# Patient Record
Sex: FEMALE | Race: WHITE | Employment: FULL TIME | URBAN - METROPOLITAN AREA
[De-identification: names, ages, dates, MRNs, and addresses within clinical notes are randomized per-mention and may not be internally consistent; named-entity substitution may affect disease eponyms.]

---

## 2017-02-23 ENCOUNTER — OFFICE VISIT (OUTPATIENT)
Dept: FAMILY MEDICINE CLINIC | Age: 27
End: 2017-02-23

## 2017-02-23 VITALS
OXYGEN SATURATION: 100 % | WEIGHT: 188.4 LBS | HEIGHT: 66 IN | RESPIRATION RATE: 18 BRPM | HEART RATE: 89 BPM | TEMPERATURE: 98.4 F | BODY MASS INDEX: 30.28 KG/M2 | SYSTOLIC BLOOD PRESSURE: 122 MMHG | DIASTOLIC BLOOD PRESSURE: 86 MMHG

## 2017-02-23 DIAGNOSIS — H61.21 IMPACTED CERUMEN OF RIGHT EAR: Primary | ICD-10-CM

## 2017-02-23 DIAGNOSIS — R63.5 WEIGHT GAIN: ICD-10-CM

## 2017-02-23 DIAGNOSIS — H60.391 OTHER INFECTIVE ACUTE OTITIS EXTERNA OF RIGHT EAR: ICD-10-CM

## 2017-02-23 DIAGNOSIS — F41.0 PANIC ATTACKS: ICD-10-CM

## 2017-02-23 DIAGNOSIS — F41.9 ANXIETY: ICD-10-CM

## 2017-02-23 RX ORDER — NEOMYCIN SULFATE, POLYMYXIN B SULFATE AND HYDROCORTISONE 10; 3.5; 1 MG/ML; MG/ML; [USP'U]/ML
3 SUSPENSION/ DROPS AURICULAR (OTIC) 4 TIMES DAILY
Qty: 10 ML | Refills: 0 | Status: SHIPPED | OUTPATIENT
Start: 2017-02-23 | End: 2017-03-02

## 2017-02-23 RX ORDER — ALPRAZOLAM 0.25 MG/1
0.25 TABLET ORAL
Qty: 30 TAB | Refills: 0 | Status: SHIPPED | OUTPATIENT
Start: 2017-02-23 | End: 2017-07-28

## 2017-02-23 RX ORDER — PAROXETINE HYDROCHLORIDE 20 MG/1
TABLET, FILM COATED ORAL
Qty: 30 TAB | Refills: 1 | Status: SHIPPED | OUTPATIENT
Start: 2017-02-23 | End: 2017-07-28

## 2017-02-23 NOTE — PROGRESS NOTES
Jonna Armas is a 23 y.o. female   Chief Complaint   Patient presents with    Medication Refill    pt states she had seen ED then came to office and saw Colonel Soto. Pt then went to urgent care. Then she went to [place] to visit and saw her prior MD there and was told likely vertigo and told to stop paxil and had already stopped xanax. Pt had been given antivert by MIMI Pope and states did not help. Pt states she did not feel better after stopping the paxil and states she was noticing the paxil was helping her quite a bit. Pt states she is still have a panic attack and using xanax prn a few times a month. Pt also concerned about weight gain but states she has been more depressed. Will restart paxil and reevaluate in 4-6 weeks. Pt also with cerumen imopaction of Reatr and would like to get cleaned out if possible. she is a 23y.o. year old female who presents for evalution. Reviewed PmHx, RxHx, FmHx, SocHx, AllgHx and updated and dated in the chart. Review of Systems - negative except as listed above in the HPI    Objective:     Vitals:    02/23/17 1507   BP: 122/86   Pulse: 89   Resp: 18   Temp: 98.4 °F (36.9 °C)   TempSrc: Oral   SpO2: 100%   Weight: 188 lb 6.4 oz (85.5 kg)   Height: 5' 6\" (1.676 m)       Current Outpatient Prescriptions   Medication Sig    ALPRAZolam (XANAX) 0.25 mg tablet Take 1 Tab by mouth daily as needed for Anxiety.  PARoxetine (PAXIL) 20 mg tablet TAKE 1 TABLET BY MOUTH EVERY DAY    neomycin-polymyxin-hydrocortisone, buffered, (PEDIOTIC) 3.5-10,000-1 mg/mL-unit/mL-% otic suspension Administer 3 Drops in right ear four (4) times daily for 7 days.  butalbital-acetaminophen-caff (FIORICET) -40 mg per capsule Take 1 Cap by mouth every six (6) hours as needed for Pain. Max Daily Amount: 4 Caps. Indications: MIGRAINE     No current facility-administered medications for this visit.         Physical Examination: General appearance - alert, well appearing, and in no distress  Mental status - alert, oriented to person, place, and time  Eyes - pupils equal and reactive, extraocular eye movements intact  Ears - left ear normal, right external canal inflamed, ceruminosis noted, cerumen removed, some cerumen  remaining  Chest - clear to auscultation, no wheezes, rales or rhonchi, symmetric air entry  Heart - normal rate, regular rhythm, normal S1, S2, no murmurs, rubs, clicks or gallops      Assessment/ Plan:   Virginia Evans was seen today for medication refill. Diagnoses and all orders for this visit:    Impacted cerumen of right ear  -     REMOVE IMPACTED EAR WAX    Panic attacks  -     ALPRAZolam (XANAX) 0.25 mg tablet; Take 1 Tab by mouth daily as needed for Anxiety. -     PARoxetine (PAXIL) 20 mg tablet; TAKE 1 TABLET BY MOUTH EVERY DAY    Anxiety  -     PARoxetine (PAXIL) 20 mg tablet; TAKE 1 TABLET BY MOUTH EVERY DAY    Other infective acute otitis externa of right ear  -     neomycin-polymyxin-hydrocortisone, buffered, (PEDIOTIC) 3.5-10,000-1 mg/mL-unit/mL-% otic suspension; Administer 3 Drops in right ear four (4) times daily for 7 days. Weight gain  Likely from depression    Use debrox x 2 weeks and RTC for lavage     Follow-up Disposition:  Return if symptoms worsen or fail to improve. I have discussed the diagnosis with the patient and the intended plan as seen in the above orders. The patient has received an after-visit summary and questions were answered concerning future plans. Pt conveyed understanding of plan.     Medication Side Effects and Warnings were discussed with patient      Larry Rivera DO

## 2017-02-23 NOTE — MR AVS SNAPSHOT
Visit Information Date & Time Provider Department Dept. Phone Encounter #  
 2/23/2017  2:45 PM [address] [facility], [address] [phone] [id number] Follow-up Instructions Return if symptoms worsen or fail to improve. Upcoming Health Maintenance Date Due  
 HPV AGE 9Y-34Y (1 of 3 - Female 3 Dose Series) 10/15/2002 DTaP/Tdap/Td series (1 - Tdap) 10/15/2012 PAP AKA CERVICAL CYTOLOGY 10/15/2012 Allergies as of 2/23/2017  Review Complete On: 2/23/2017 By: [address] [facility], DO No Known Allergies Current Immunizations  Reviewed on 10/11/2016 Name Date Influenza Vaccine (Quad) PF 10/11/2016 Not reviewed this visit You Were Diagnosed With   
  
 Codes Comments Impacted cerumen of right ear    -  Primary ICD-10-CM: H61.21 ICD-9-CM: 380.4 Panic attacks     ICD-10-CM: F41.0 ICD-9-CM: 300.01 Anxiety     ICD-10-CM: F41.9 ICD-9-CM: 300.00 Vitals BP  
  
  
  
  
  
 122/86 Vitals History BMI and BSA Data Body Mass Index Body Surface Area  
 30.41 kg/m 2 2 m 2 Preferred Pharmacy Pharmacy Name Phone [facility]  [address] [phone] Your Updated Medication List  
  
   
This list is accurate as of: 2/23/17  3:47 PM.  Always use your most recent med list.  
  
  
  
  
 ALPRAZolam 0.25 mg tablet Commonly known as:  Lorenzo Bond Take 1 Tab by mouth daily as needed for Anxiety. butalbital-acetaminophen-caff -40 mg per capsule Commonly known as:  [employer] Take 1 Cap by mouth every six (6) hours as needed for Pain. Max Daily Amount: 4 Caps. Indications: MIGRAINE PARoxetine 20 mg tablet Commonly known as:  PAXIL TAKE 1 TABLET BY MOUTH EVERY DAY Prescriptions Printed Refills ALPRAZolam (XANAX) 0.25 mg tablet 0 Sig: Take 1 Tab by mouth daily as needed for Anxiety. Class: Print  Route: Oral  
  
Prescriptions Sent to Pharmacy Refills PARoxetine (PAXIL) 20 mg tablet 1 Sig: TAKE 1 TABLET BY MOUTH EVERY DAY Class: Normal  
 Pharmacy: [facility] [address], [address] [address]  #: [phone] Follow-up Instructions Return if symptoms worsen or fail to improve. Patient Instructions Depression and Chronic Disease: Care Instructions Your Care Instructions A chronic disease is one that you have for a long time. Some chronic diseases can be controlled, but they usually cannot be cured. Depression is common in people with chronic diseases, but it often goes unnoticed. Many people have concerns about seeking treatment for a mental health problem. You may think it's a sign of weakness, or you don't want people to know about it. It's important to overcome these reasons for not seeking treatment. Treating depression or anxiety is good for your health. Follow-up care is a key part of your treatment and safety. Be sure to make and go to all appointments, and call your doctor if you are having problems. It's also a good idea to know your test results and keep a list of the medicines you take. How can you care for yourself at home? Watch for symptoms of depression The symptoms of depression are often subtle at first. You may think they are caused by your disease rather than depression. Or you may think it is normal to be depressed when you have a chronic disease. If you are depressed you may: · Feel sad or hopeless. · Feel guilty or worthless. · Not enjoy the things you used to enjoy. · Feel hopeless, as though life is not worth living. · Have trouble thinking or remembering. · Have low energy, and you may not eat or sleep well. · Pull away from others. · Think often about death or killing yourself. (Keep the numbers for these national suicide hotlines: 4-719-167-TALK [1-922.995.1002] and 5-428-QHOANCS [1-104.920.5066]. ) Get treatment By treating your depression, you can feel more hopeful and have more energy. If you feel better, you may take better care of yourself, so your health may improve. · Talk to your doctor if you have any changes in mood during treatment for your disease. · Ask your doctor for help. Counseling, antidepressant medicine, or a combination of the two can help most people with depression. Often a combination works best. Counseling can also help you cope with having a chronic disease. When should you call for help? Call 911 anytime you think you may need emergency care. For example, call if: 
· You feel like hurting yourself or someone else. · Someone you know has depression and is about to attempt or is attempting suicide. Call your doctor now or seek immediate medical care if: 
· You hear voices. · Someone you know has depression and: 
¨ Starts to give away his or her possessions. ¨ Uses illegal drugs or drinks alcohol heavily. ¨ Talks or writes about death, including writing suicide notes or talking about guns, knives, or pills. ¨ Starts to spend a lot of time alone. ¨ Acts very aggressively or suddenly appears calm. Watch closely for changes in your health, and be sure to contact your doctor if: 
· You do not get better as expected. Where can you learn more? Go to http://feng-padmini.info/. Enter V430 in the search box to learn more about \"Depression and Chronic Disease: Care Instructions. \" Current as of: July 26, 2016 Content Version: 11.1 © 3908-6246 [employer]. Care instructions adapted under license by [employer] (which disclaims liability or warranty for this information). If you have questions about a medical condition or this instruction, always ask your healthcare professional. [address] any warranty or liability for your use of this information. Introducing [facility]!    
 Clayton Quezada introduces DramaFever patient portal. Now you can access parts of your medical record, email your doctor's office, and request medication refills online. 1. In your internet browser, go to [email or web address] 2. Click on the First Time User? Click Here link in the Sign In box. You will see the New Member Sign Up page. 3. Enter your Restlet Access Code exactly as it appears below. You will not need to use this code after youve completed the sign-up process. If you do not sign up before the expiration date, you must request a new code. · Restlet Access Code: [email or web address] Expires: 5/24/2017  3:28 PM 
 
4. Enter the last four digits of your Social Security Number (xxxx) and Date of Birth (mm/dd/yyyy) as indicated and click Submit. You will be taken to the next sign-up page. 5. Create a Restlet ID. This will be your Restlet login ID and cannot be changed, so think of one that is secure and easy to remember. 6. Create a Restlet password. You can change your password at any time. 7. Enter your Password Reset Question and Answer. This can be used at a later time if you forget your password. 8. Enter your e-mail address. You will receive e-mail notification when new information is available in [address] 9. Click Sign Up. You can now view and download portions of your medical record. 10. Click the Download Summary menu link to download a portable copy of your medical information. If you have questions, please visit the Frequently Asked Questions section of the Restlet website. Remember, Restlet is NOT to be used for urgent needs. For medical emergencies, dial 911. Now available from your iPhone and Android! Please provide this summary of care documentation to your next provider. Your primary care clinician is listed as Lluvia Cisneros. If you have any questions after today's visit, please call [phone].

## 2017-02-23 NOTE — PROGRESS NOTES
Pt states she stopped paxil and xanax d/t vertigo issues, thought it may be causing.  Noticed no difference with dizziness once stopping med  Would like to restart  Would like to discuss weight gain as well

## 2017-07-28 ENCOUNTER — HOSPITAL ENCOUNTER (EMERGENCY)
Age: 27
Discharge: HOME | End: 2017-07-28
Attending: EMERGENCY MEDICINE | Admitting: EMERGENCY MEDICINE
Payer: COMMERCIAL

## 2017-07-28 ENCOUNTER — APPOINTMENT (OUTPATIENT)
Dept: GENERAL RADIOLOGY | Age: 27
End: 2017-07-28
Attending: PHYSICIAN ASSISTANT
Payer: COMMERCIAL

## 2017-07-28 VITALS
TEMPERATURE: 98.2 F | OXYGEN SATURATION: 98 % | SYSTOLIC BLOOD PRESSURE: 143 MMHG | RESPIRATION RATE: 18 BRPM | HEART RATE: 78 BPM | DIASTOLIC BLOOD PRESSURE: 86 MMHG

## 2017-07-28 DIAGNOSIS — M54.2 NECK PAIN: ICD-10-CM
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
## 2017-07-28 PROCEDURE — 99282 EMERGENCY DEPT VISIT SF MDM: CPT

## 2017-07-28 PROCEDURE — 72050 X-RAY EXAM NECK SPINE 4/5VWS: CPT

## 2017-07-28 RX ORDER — METHOCARBAMOL 750 MG/1
750 TABLET, FILM COATED ORAL 4 TIMES DAILY
Qty: 20 TAB | Refills: 0 | Status: SHIPPED | OUTPATIENT
Start: 2017-07-28 | End: 2018-09-07 | Stop reason: ALTCHOICE

## 2017-07-28 RX ORDER — HYDROCODONE BITARTRATE AND ACETAMINOPHEN 5; 325 MG/1; MG/1
1 TABLET ORAL
Qty: 10 TAB | Refills: 0 | Status: SHIPPED | OUTPATIENT
Start: 2017-07-28 | End: 2018-09-07
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
## 2017-07-28 NOTE — LETTER
[address] 
[facility] EMERGENCY DEPT 
[address] [place] [address]. [address] [id number] [phone] Work/School Note Date: 7/28/2017 To Whom It May concern: 
 
Margaret Mathur was seen and treated today in the emergency room by the following provider(s): 
Physician Assistant: GORDY Hoffman. Margaret Mathur NO WORK 24 HOURS. Sincerely, GORDY Hoffman

## 2017-07-28 NOTE — ED NOTES
GORDY Paredes provided patient with verbal and written discharge instructions. Patient discharged ambulatory.
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
## 2017-07-31 ENCOUNTER — TELEPHONE (OUTPATIENT)
Dept: NEUROLOGY | Age: 27
End: 2017-07-31
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
## 2017-08-11 ENCOUNTER — OFFICE VISIT (OUTPATIENT)
Dept: FAMILY MEDICINE CLINIC | Age: 27
End: 2017-08-11

## 2017-08-11 VITALS
HEART RATE: 70 BPM | OXYGEN SATURATION: 95 % | WEIGHT: 171.1 LBS | RESPIRATION RATE: 16 BRPM | SYSTOLIC BLOOD PRESSURE: 122 MMHG | BODY MASS INDEX: 27.5 KG/M2 | TEMPERATURE: 98.8 F | HEIGHT: 66 IN | DIASTOLIC BLOOD PRESSURE: 83 MMHG

## 2017-08-11 DIAGNOSIS — S06.0X0A CONCUSSION, WITHOUT LOC, INITIAL ENCOUNTER: Primary | ICD-10-CM

## 2017-08-11 DIAGNOSIS — R42 DIZZINESS: ICD-10-CM

## 2017-08-11 DIAGNOSIS — R11.0 NAUSEA: ICD-10-CM

## 2017-08-11 RX ORDER — ONDANSETRON 4 MG/1
4 TABLET, ORALLY DISINTEGRATING ORAL
Qty: 20 TAB | Refills: 0 | Status: SHIPPED | OUTPATIENT
Start: 2017-08-11 | End: 2018-09-07 | Stop reason: ALTCHOICE

## 2017-08-11 RX ORDER — SCOLOPAMINE TRANSDERMAL SYSTEM 1 MG/1
1.5 PATCH, EXTENDED RELEASE TRANSDERMAL
Qty: 4 PATCH | Refills: 0 | Status: SHIPPED | OUTPATIENT
Start: 2017-08-11 | End: 2018-09-07 | Stop reason: ALTCHOICE

## 2017-08-11 NOTE — LETTER
8/14/2017 8:40 AM 
 
Ms. Nell Copeland 
[address] [address] To Whom It May Concern: 
 
 
 
Please excuse missed work Friday 8/11/17 due to appointment here to evaluate medical condition. Sincerely, Chele De Paz NP
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
## 2017-08-11 NOTE — PATIENT INSTRUCTIONS
A mild TBI or concussion is an injury to the brain that may result after blunt force or an acceleration/deceleration head injury. You need to rest your brain. Please rest your brain - restrict (or stop) using electronics - such as computers, tv, phones - until the symptoms (dizziness, headache, nausea) go away. Get plenty of sleep and eat a healthy diet. Symptoms may reoccur with physical exertion. Signs to watch for:  Problems could arise over the first 24-48 hours. You should not be left alone and must go to a hospital at once it you:  1. Have a headache that gets worse. 2. Are very drowsy or cant be awakened (woken up). 3. Cant recognize people or places. 4. Have repeated vomiting  5. Behave unusually or seem confused; are very irritable. 6. Have seizures (arms and legs jerk uncontrollably). 7. Are unsteady on your feet; have slurred speech; vision or hearing changes. Individuals who have had a head injury are at higher risk for recurrent head injuries.

## 2017-08-11 NOTE — MR AVS SNAPSHOT
Visit Information Date & Time Provider Department Dept. Phone Encounter #  
 8/11/2017 10:30 AM Milad Marx NP [facility] [phone] [id number] Upcoming Health Maintenance Date Due  
 HPV AGE 9Y-34Y (1 of 3 - Female 3 Dose Series) 10/15/2002 DTaP/Tdap/Td series (1 - Tdap) 10/15/2012 PAP AKA CERVICAL CYTOLOGY 10/15/2012 INFLUENZA AGE 9 TO ADULT 8/1/2017 Allergies as of 8/11/2017  Review Complete On: 8/11/2017 By: Neal Han No Known Allergies Current Immunizations  Reviewed on 10/11/2016 Name Date Influenza Vaccine (Quad) PF 10/11/2016 Not reviewed this visit You Were Diagnosed With   
  
 Codes Comments Concussion, without LOC, initial encounter    -  Primary ICD-10-CM: S06.0X0A 
ICD-9-CM: 850.0 Nausea     ICD-10-CM: R11.0 ICD-9-CM: 787.02 Dizziness     ICD-10-CM: D78 ICD-9-CM: 780.4 Vitals BP Pulse Temp Resp Height(growth percentile) Weight(growth percentile) 122/83 (BP 1 Location: Left arm, BP Patient Position: Sitting) 70 98.8 °F (37.1 °C) (Oral) 16 5' 6\" (1.676 m) 171 lb 1.6 oz (77.6 kg) LMP SpO2 BMI OB Status Smoking Status 08/05/2017 95% 27.62 kg/m2 Having regular periods Never Smoker Vitals History BMI and BSA Data Body Mass Index Body Surface Area  
 27.62 kg/m 2 1.9 m 2 Preferred Pharmacy Pharmacy Name Phone [facility]  [address] [phone] Your Updated Medication List  
  
   
This list is accurate as of: 8/11/17 12:02 PM.  Always use your most recent med list.  
  
  
  
  
 HYDROcodone-acetaminophen 5-325 mg per tablet Commonly known as:  Adilene Dieter Take 1 Tab by mouth every six (6) hours as needed for Pain. Max Daily Amount: 4 Tabs. methocarbamol 750 mg tablet Commonly known as:  XZDGZOT-207 Take 1 Tab by mouth four (4) times daily. ondansetron 4 mg disintegrating tablet Commonly known as: ZOFRAN ODT Take 1 Tab by mouth every eight (8) hours as needed for Nausea. scopolamine 1.5 mg (1 mg over 3 days) Pt3d Commonly known as:  TRANSDERM-SCOP  
1 Patch by TransDERmal route every seventy-two (72) hours. Prescriptions Sent to Pharmacy Refills  
 ondansetron (ZOFRAN ODT) 4 mg disintegrating tablet 0 Sig: Take 1 Tab by mouth every eight (8) hours as needed for Nausea. Class: Normal  
 Pharmacy: [address], [address] [place] [address] Ph #: [phone] Route: Oral  
 scopolamine (TRANSDERM-SCOP) 1.5 mg (1 mg over 3 days) pt3d 0 Si Patch by TransDERmal route every seventy-two (72) hours. Class: Normal  
 Pharmacy: [address], [address] [place] [address] #: [phone] Route: TransDERmal  
  
Patient Instructions A mild TBI or concussion is an injury to the brain that may result after blunt force or an acceleration/deceleration head injury. You need to rest your brain. Please rest your brain - restrict (or stop) using electronics - such as computers, tv, phones - until the symptoms (dizziness, headache, nausea) go away. Get plenty of sleep and eat a healthy diet. Symptoms may reoccur with physical exertion. Signs to watch for: 
Problems could arise over the first 24-48 hours. You should not be left alone and must go to a hospital at once it you: 1. Have a headache that gets worse. 2. Are very drowsy or cant be awakened (woken up). 3. Cant recognize people or places. 4. Have repeated vomiting 5. Behave unusually or seem confused; are very irritable. 6. Have seizures (arms and legs jerk uncontrollably). 7. Are unsteady on your feet; have slurred speech; vision or hearing changes. Individuals who have had a head injury are at higher risk for recurrent head injuries. Introducing [facility]!    
 Nilsa Golden introduces OnState patient portal. Now you can access parts of your medical record, email your doctor's office, and request medication refills online. 1. In your internet browser, go to [email or web address] 2. Click on the First Time User? Click Here link in the Sign In box. You will see the New Member Sign Up page. 3. Enter your Kahub Access Code exactly as it appears below. You will not need to use this code after youve completed the sign-up process. If you do not sign up before the expiration date, you must request a new code. · Kahub Access Code: [email or web address] Expires: 10/26/2017  9:57 AM 
 
4. Enter the last four digits of your Social Security Number (xxxx) and Date of Birth (mm/dd/yyyy) as indicated and click Submit. You will be taken to the next sign-up page. 5. Create a Kahub ID. This will be your Kahub login ID and cannot be changed, so think of one that is secure and easy to remember. 6. Create a Kahub password. You can change your password at any time. 7. Enter your Password Reset Question and Answer. This can be used at a later time if you forget your password. 8. Enter your e-mail address. You will receive e-mail notification when new information is available in [address] 9. Click Sign Up. You can now view and download portions of your medical record. 10. Click the Download Summary menu link to download a portable copy of your medical information. If you have questions, please visit the Frequently Asked Questions section of the Kahub website. Remember, Kahub is NOT to be used for urgent needs. For medical emergencies, dial 911. Now available from your iPhone and Android! Please provide this summary of care documentation to your next provider. Your primary care clinician is listed as Lillie Bernal. If you have any questions after today's visit, please call [phone].

## 2017-08-14 ENCOUNTER — HOSPITAL ENCOUNTER (EMERGENCY)
Age: 27
Discharge: HOME | End: 2017-08-14
Attending: EMERGENCY MEDICINE | Admitting: EMERGENCY MEDICINE
Payer: COMMERCIAL

## 2017-08-14 ENCOUNTER — TELEPHONE (OUTPATIENT)
Dept: FAMILY MEDICINE CLINIC | Age: 27
End: 2017-08-14

## 2017-08-14 ENCOUNTER — APPOINTMENT (OUTPATIENT)
Dept: CT IMAGING | Age: 27
End: 2017-08-14
Attending: EMERGENCY MEDICINE
Payer: COMMERCIAL

## 2017-08-14 DIAGNOSIS — F07.81 POST CONCUSSION SYNDROME: Primary | ICD-10-CM

## 2017-08-14 DIAGNOSIS — F07.81 POST-CONCUSSION VERTIGO: ICD-10-CM

## 2017-08-14 DIAGNOSIS — R42 POST-CONCUSSION VERTIGO: ICD-10-CM

## 2017-08-14 PROCEDURE — 96374 THER/PROPH/DIAG INJ IV PUSH: CPT

## 2017-08-14 PROCEDURE — 96375 TX/PRO/DX INJ NEW DRUG ADDON: CPT

## 2017-08-14 PROCEDURE — 99282 EMERGENCY DEPT VISIT SF MDM: CPT

## 2017-08-14 PROCEDURE — 70450 CT HEAD/BRAIN W/O DYE: CPT

## 2017-08-14 PROCEDURE — 74011250636 HC RX REV CODE- 250/636: Performed by: EMERGENCY MEDICINE

## 2017-08-14 PROCEDURE — 96361 HYDRATE IV INFUSION ADD-ON: CPT

## 2017-08-14 RX ORDER — METOCLOPRAMIDE HYDROCHLORIDE 5 MG/ML
10 INJECTION INTRAMUSCULAR; INTRAVENOUS
Status: COMPLETED | OUTPATIENT
Start: 2017-08-14 | End: 2017-08-14

## 2017-08-14 RX ORDER — KETOROLAC TROMETHAMINE 30 MG/ML
30 INJECTION, SOLUTION INTRAMUSCULAR; INTRAVENOUS
Status: COMPLETED | OUTPATIENT
Start: 2017-08-14 | End: 2017-08-14

## 2017-08-14 RX ORDER — DIPHENHYDRAMINE HYDROCHLORIDE 50 MG/ML
25 INJECTION, SOLUTION INTRAMUSCULAR; INTRAVENOUS
Status: COMPLETED | OUTPATIENT
Start: 2017-08-14 | End: 2017-08-14

## 2017-08-14 RX ORDER — BUTALBITAL, ACETAMINOPHEN AND CAFFEINE 300; 40; 50 MG/1; MG/1; MG/1
1-2 CAPSULE ORAL
Qty: 30 CAP | Refills: 0 | Status: SHIPPED | OUTPATIENT
Start: 2017-08-14 | End: 2017-08-24 | Stop reason: DRUGHIGH

## 2017-08-14 RX ORDER — SODIUM CHLORIDE 0.9 % (FLUSH) 0.9 %
5-10 SYRINGE (ML) INJECTION EVERY 8 HOURS
Status: DISCONTINUED | OUTPATIENT
Start: 2017-08-14 | End: 2017-08-14 | Stop reason: HOSPADM

## 2017-08-14 RX ORDER — MECLIZINE HYDROCHLORIDE 25 MG/1
25 TABLET ORAL
Qty: 30 TAB | Refills: 0 | Status: SHIPPED | OUTPATIENT
Start: 2017-08-14 | End: 2017-08-24

## 2017-08-14 RX ORDER — SODIUM CHLORIDE 0.9 % (FLUSH) 0.9 %
5-10 SYRINGE (ML) INJECTION AS NEEDED
Status: DISCONTINUED | OUTPATIENT
Start: 2017-08-14 | End: 2017-08-14 | Stop reason: HOSPADM

## 2017-08-14 RX ADMIN — SODIUM CHLORIDE 1000 ML: 900 INJECTION, SOLUTION INTRAVENOUS at 11:49

## 2017-08-14 RX ADMIN — DIPHENHYDRAMINE HYDROCHLORIDE 25 MG: 50 INJECTION, SOLUTION INTRAMUSCULAR; INTRAVENOUS at 11:49

## 2017-08-14 RX ADMIN — METOCLOPRAMIDE 10 MG: 5 INJECTION, SOLUTION INTRAMUSCULAR; INTRAVENOUS at 11:49

## 2017-08-14 RX ADMIN — KETOROLAC TROMETHAMINE 30 MG: 30 INJECTION, SOLUTION INTRAMUSCULAR at 11:49
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
## 2017-08-14 NOTE — LETTER
[facility] EMERGENCY DEPT 
[address] [facility] [address] 
[phone] Work/School Note Date: 8/14/2017 To Whom It May concern: 
 
Tanner Camilo was seen and treated today in the emergency room by the following provider(s): 
Attending Provider: Rachelle Corral DO. Tanner Camilo may return to work on 8/16/2017. Vishal Ring  
 
Sincerely, 
 
 
 
 
Rachelle Corral DO
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
## 2017-08-14 NOTE — TELEPHONE ENCOUNTER
Patient called from her work today. She states the dizziness is worse and her vision this morning was so blurry she couldn't see her cell phone. She did put scopolamine patch on Sunday, but feels this may be making dizziness worse. Advised to remove patch and not to use it. Also advised if she has increasing symptoms, including headache, she should consider going to ER for evaluation. Pt requested letter to be faxed to her office this am stating she was seen at [facility] on Friday. Faxed to [phone]. Told her I would put in a referral for neurology as well.

## 2017-08-24 ENCOUNTER — TELEPHONE (OUTPATIENT)
Dept: FAMILY MEDICINE CLINIC | Age: 27
End: 2017-08-24

## 2017-08-24 RX ORDER — BUTALBITAL, ACETAMINOPHEN AND CAFFEINE 50; 325; 40 MG/1; MG/1; MG/1
1 TABLET ORAL
Qty: 30 TAB | Refills: 0 | OUTPATIENT
Start: 2017-08-24 | End: 2018-09-07

## 2017-08-24 NOTE — TELEPHONE ENCOUNTER
Spoke with pt - still experiencing HA, depression increasing and hard to concentrate. States she feels so bad she \"can't even play with her son\". Aashish has been able to control HA and she is requesting refill. Refill called in to [facility].  Referred her to Dr. Harsha Crisostomo for full concussion work up and treatment.

## 2017-09-26 ENCOUNTER — OFFICE VISIT (OUTPATIENT)
Dept: INTERNAL MEDICINE CLINIC | Age: 27
End: 2017-09-26

## 2017-09-26 VITALS
WEIGHT: 170 LBS | BODY MASS INDEX: 27.32 KG/M2 | OXYGEN SATURATION: 100 % | HEART RATE: 68 BPM | RESPIRATION RATE: 16 BRPM | SYSTOLIC BLOOD PRESSURE: 117 MMHG | HEIGHT: 66 IN | TEMPERATURE: 98 F | DIASTOLIC BLOOD PRESSURE: 80 MMHG

## 2017-09-26 DIAGNOSIS — F41.8 ANXIETY WITH DEPRESSION: ICD-10-CM

## 2017-09-26 DIAGNOSIS — S06.0X0A CONCUSSION, WITHOUT LOC, INITIAL ENCOUNTER: Primary | ICD-10-CM

## 2017-09-26 DIAGNOSIS — M54.2 TRIGGER POINT OF NECK: ICD-10-CM

## 2017-09-26 RX ORDER — ESCITALOPRAM OXALATE 5 MG/1
5 TABLET ORAL DAILY
Qty: 30 TAB | Refills: 3 | Status: SHIPPED | OUTPATIENT
Start: 2017-09-26 | End: 2017-10-10 | Stop reason: DRUGHIGH

## 2017-09-26 RX ORDER — TRIAMCINOLONE ACETONIDE 40 MG/ML
40 INJECTION, SUSPENSION INTRA-ARTICULAR; INTRAMUSCULAR ONCE
Qty: 1 ML | Refills: 0
Start: 2017-09-26 | End: 2017-09-26
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
## 2017-09-26 NOTE — MR AVS SNAPSHOT
Visit Information Date & Time Provider Department Dept. Phone Encounter #  
 9/26/2017  4:00 PM [facility] MD Heydi Tucker [facility] and [place] [id number] Follow-up Instructions Return in about 2 weeks (around 10/10/2017). Upcoming Health Maintenance Date Due  
 HPV AGE 9Y-34Y (1 of 3 - Female 3 Dose Series) 10/15/2002 DTaP/Tdap/Td series (1 - Tdap) 10/15/2012 INFLUENZA AGE 9 TO ADULT 8/1/2017 PAP AKA CERVICAL CYTOLOGY 8/22/2020 Allergies as of 9/26/2017  Review Complete On: 9/26/2017 By: [facility] MD Caitlin  
 No Known Allergies Current Immunizations  Reviewed on 10/11/2016 Name Date Influenza Vaccine (Quad) PF 10/11/2016 Not reviewed this visit You Were Diagnosed With   
  
 Codes Comments Concussion, without LOC, initial encounter    -  Primary ICD-10-CM: S06.0X0A 
ICD-9-CM: 850.0 Trigger point of neck     ICD-10-CM: M54.2 ICD-9-CM: 723.1 Anxiety with depression     ICD-10-CM: F41.8 ICD-9-CM: 300.4 Vitals BP Pulse Temp Resp Height(growth percentile) Weight(growth percentile) 117/80 (BP 1 Location: Right arm, BP Patient Position: Sitting) 68 98 °F (36.7 °C) (Oral) 16 5' 6\" (1.676 m) 170 lb (77.1 kg) SpO2 BMI OB Status Smoking Status 100% 27.44 kg/m2 Having regular periods Never Smoker BMI and BSA Data Body Mass Index Body Surface Area  
 27.44 kg/m 2 1.89 m 2 Preferred Pharmacy Pharmacy Name Phone [facility] Sreekanth Olivia [address] [phone] Your Updated Medication List  
  
   
This list is accurate as of: 9/26/17  5:25 PM.  Always use your most recent med list.  
  
  
  
  
 butalbital-acetaminophen-caffeine -40 mg per tablet Commonly known as:  Sidra Gerold Take 1 Tab by mouth every four (4) hours as needed for Pain or Headache. Max Daily Amount: 6 Tabs. escitalopram oxalate 5 mg tablet Commonly known as: Abdon Prost Take 1 Tab by mouth daily. HYDROcodone-acetaminophen 5-325 mg per tablet Commonly known as:  Marquis Simpson Take 1 Tab by mouth every six (6) hours as needed for Pain. Max Daily Amount: 4 Tabs. methocarbamol 750 mg tablet Commonly known as:  PEKKBPN-090 Take 1 Tab by mouth four (4) times daily. ondansetron 4 mg disintegrating tablet Commonly known as:  ZOFRAN ODT Take 1 Tab by mouth every eight (8) hours as needed for Nausea. scopolamine 1.5 mg (1 mg over 3 days) Pt3d Commonly known as:  TRANSDERM-SCOP  
1 Patch by TransDERmal route every seventy-two (72) hours. triamcinolone acetonide 40 mg/mL injection Commonly known as:  KENALOG  
1 mL by IntraMUSCular route once for 1 dose. Prescriptions Sent to Pharmacy Refills  
 escitalopram oxalate (LEXAPRO) 5 mg tablet 3 Sig: Take 1 Tab by mouth daily. Class: Normal  
 Pharmacy: [address] #: [phone] Route: Oral  
  
We Performed the Following AL INJECT TRIGGER POINT, 1 OR 2 [id number] CPT(R)] REFERRAL TO PHYSICAL THERAPY [TTH60 Custom] TRIAMCINOLONE ACETONIDE INJ [ [facility]] Follow-up Instructions Return in about 2 weeks (around 10/10/2017). Referral Information Referral ID Referred By Referred To  
  
 [id number] [address] PT [facility]IN   
   [address], [address] Phone: [phone] Fax: [phone] Visits Status Start Date End Date  
 20 New Request 9/26/17 9/26/18 If your referral has a status of pending review or denied, additional information will be sent to support the outcome of this decision. Introducing [facility]! [facility] introduces [employer] patient portal. Now you can access parts of your medical record, email your doctor's office, and request medication refills online.    
 
1. In your internet browser, go to [email or web address] 2. Click on the First Time User? Click Here link in the Sign In box. You will see the New Member Sign Up page. 3. Enter your Bab[employer] Access Code exactly as it appears below. You will not need to use this code after youve completed the sign-up process. If you do not sign up before the expiration date, you must request a new code. · [employer] Access Code: [email or web address] Expires: 10/26/2017  9:57 AM 
 
4. Enter the last four digits of your Social Security Number (xxxx) and Date of Birth (mm/dd/yyyy) as indicated and click Submit. You will be taken to the next sign-up page. 5. Create a Baboo ID. This will be your Baboo login ID and cannot be changed, so think of one that is secure and easy to remember. 6. Create a Baboo password. You can change your password at any time. 7. Enter your Password Reset Question and Answer. This can be used at a later time if you forget your password. 8. Enter your e-mail address. You will receive e-mail notification when new information is available in [address] 9. Click Sign Up. You can now view and download portions of your medical record. 10. Click the Download Summary menu link to download a portable copy of your medical information. If you have questions, please visit the Frequently Asked Questions section of the [employer] website. Remember, Baboo is NOT to be used for urgent needs. For medical emergencies, dial 911. Now available from your iPhone and Android! Please provide this summary of care documentation to your next provider. Your primary care clinician is listed as Phys Other. If you have any questions after today's visit, please call [phone].

## 2017-09-26 NOTE — LETTER
NOTIFICATION RETURN TO WORK / SCHOOL 
 
9/26/2017 5:29 PM 
 
Ms. Nell Copeland 
[id number]  [phone] To Whom It May Concern: 
 
Brigitte Sellers is currently under the care of Natalie Edwards. 9/26/2017 She will return to work/school on: 9/27/2017 If there are questions or concerns please have the patient contact our office. Sincerely, Lita Whitman MD

## 2017-09-28 ENCOUNTER — HOSPITAL ENCOUNTER (OUTPATIENT)
Dept: PHYSICAL THERAPY | Age: 27
Discharge: HOME | End: 2017-09-28
Payer: COMMERCIAL

## 2017-09-28 PROCEDURE — 97112 NEUROMUSCULAR REEDUCATION: CPT | Performed by: PHYSICAL THERAPIST

## 2017-09-28 PROCEDURE — 97161 PT EVAL LOW COMPLEX 20 MIN: CPT | Performed by: PHYSICAL THERAPIST

## 2017-09-28 NOTE — PROGRESS NOTES
[facility]  [address]  Phone: [phone]  Fax: [phone]    Plan of Care/Statement of Necessity for Physical Therapy Services  2-15    Patient name: Audie Verde  : 1990  Provider#: [id number]  Referral source: Tressa Villar MD      Medical/Treatment Diagnosis: Postconcussional syndrome [F07.81]     Prior Hospitalization: see medical history     Comorbidities: anxiety and depression  Prior Level of Function: complete 20 minutes of exercise seldom or never  Medications: Verified on Patient Summary List    Start of Care: 2017      Onset Date: 2017       The Plan of Care and following information is based on the information from the initial evaluation. Assessment/ cordova information: 23 y.o. Female with post-concussion syndrome with visual deficits (convergence, gaze stabilization and VOR cancellation) and balance deficits present. She is also presenting with cervicogenic source of headaches secondary to cervical strain/sprain.     Evaluation Complexity History MEDIUM  Complexity : 1-2 comorbidities / personal factors will impact the outcome/ POC ; Examination HIGH Complexity : 4+ Standardized tests and measures addressing body structure, function, activity limitation and / or participation in recreation  ;Presentation LOW Complexity : Stable, uncomplicated  ;Clinical Decision Making LOW Complexity : FOTO score of   Overall Complexity Rating: LOW     Problem List: pain affecting function, decrease ROM, decrease strength, impaired gait/ balance, decrease ADL/ functional abilitiies, decrease activity tolerance and decrease flexibility/ joint mobility   Treatment Plan may include any combination of the following: Therapeutic exercise, Therapeutic activities, Neuromuscular re-education, Physical agent/modality, Gait/balance training, Manual therapy, Patient education and Self Care training  Patient / Family readiness to learn indicated by: asking questions and trying to perform skills  Persons(s) to be included in education: patient (P)  Barriers to Learning/Limitations: None  Patient Goal (s): regain strength, feel confident, feel better and be able to go back to work stronger  Patient Self Reported Health Status: good  Rehabilitation Potential: good    Short Term Goals: To be accomplished in 2-4 treatments:  1)  Independent with HEP  2) Perform Gaze Stabilization at 100 bpm in sitting for >/= 30 seconds before onset of symptoms  3) Perform convergence >/= 8 cm before onset of symptoms  4) Perform VOR Cancellation in sitting for >/=30 seconds before onset of symptoms  5) Balance on firm surface with eyes closed >/= 30seconds before onset of symptoms    Long Term Goals: To be accomplished in 5-10 treatments:  1) Perform Gaze Stabilization x1 while walking for >/= 60 seconds without symptoms  2) Perform Gaze Stabilization x2 while walking for >/= 60 seconds without symptoms  3) Perform convergence >/= 6 cm without symptoms  4) Perform VOR Cancellation while walking without reproduction of symptoms  5) Balance on firm surface with eyes closed >/= 60 seconds without symptoms   6) Balance on soft surface with eyes closed >/= 60 seconds without symptoms  7) Tolerate visual conflict while walking without symptoms  8) Tolerate return to physical activity protocol without symptoms    Frequency / Duration: Patient to be seen 2 times per week for 5-10 treatments. Patient/ Caregiver education and instruction: activity modification and exercises    [x]  Plan of care has been reviewed with STEVAN Bull PT, DPT 9/28/2017 4:32 PM    ________________________________________________________________________    I certify that the above Therapy Services are being furnished while the patient is under my care. I agree with the treatment plan and certify that this therapy is necessary.     Physician's Signature:____________________ Date:____________Time: _________
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
## 2017-10-03 ENCOUNTER — HOSPITAL ENCOUNTER (OUTPATIENT)
Dept: PHYSICAL THERAPY | Age: 27
Discharge: HOME | End: 2017-10-03
Payer: COMMERCIAL

## 2017-10-03 PROCEDURE — 97110 THERAPEUTIC EXERCISES: CPT | Performed by: PHYSICAL THERAPIST

## 2017-10-03 PROCEDURE — 97014 ELECTRIC STIMULATION THERAPY: CPT | Performed by: PHYSICAL THERAPIST

## 2017-10-03 PROCEDURE — 97140 MANUAL THERAPY 1/> REGIONS: CPT | Performed by: PHYSICAL THERAPIST

## 2017-10-03 NOTE — PROGRESS NOTES
PT DAILY TREATMENT NOTE - [facility] 2-15    Patient Name: Steve Tamayo  Date:10/3/2017  : 1990  [x]  Patient  Verified  Payor: BRANDY [place] / Plan: [address] [place] [address] / Product Type: PPO /    In time:730a  Out time:900a  Total Treatment Time (min): 65  Total Timed Codes (min): 50  1:1 Treatment Time ( only): --   Visit #: 2     Treatment Area: Postconcussional syndrome [F07.81]    SUBJECTIVE  Pain Level (0-10 scale): 0/10  Any medication changes, allergies to medications, adverse drug reactions, diagnosis change, or new procedure performed?: [x] No    [] Yes (see summary sheet for update)  Subjective functional status/changes:   [] No changes reported  Pt states that she was able to ride her bike for 20 minutes without any difficulty. \"Last night was horrible, starting at 3pm she began to have tightness in the right side of her neck and I just felt stiff. \"    OBJECTIVE    Modality rationale: decrease pain to improve the patients ability to look over shoulders   Min Type Additional Details   15 [x] Estim: []Att   [x]Unatt        []TENS instruct                  []IFC  [x]Premod   []NMES                     []Other:  []w/US   []w/ice   [x]w/heat  Position: supine  Location: right levator scapulae    []  Traction: [] Cervical       []Lumbar                       [] Prone          []Supine                       []Intermittent   []Continuous Lbs:  [] before manual  [] after manual  []w/heat    []  Ultrasound: []Continuous   [] Pulsed at:                           []1MHz   []3MHz Location:  W/cm2:    [] Paraffin         Location:   []w/heat    []  Ice     []  Heat  []  Ice massage Position:  Location:    []  Laser  []  Other: Position:  Location:      []  Vasopneumatic Device Pressure:       [] lo [] med [] hi   Temperature:      [x] Skin assessment post-treatment:  [x]intact []redness- no adverse reaction    []redness - adverse reaction:     25 min Therapeutic Exercise:  [x] See flow sheet : Rationale: increase ROM, increase strength, improve coordination, improve balance and increase proprioception to improve the patients ability to resume physical activity      25 min Manual Therapy: STM to rigth cervical paraspinal paraspinal, levator scapulae muscles and passive right levator scapulae stretching    Rationale: decrease pain, increase ROM and increase tissue extensibility to improve the patients ability to look over shoulders            With   [] TE   [] TA   [] neuro   [] other: Patient Education: [x] Review HEP    [] Progressed/Changed HEP based on:   [] positioning   [] body mechanics   [] transfers   [] heat/ice application    [] other:      Other Objective/Functional Measures: --     Pain Level (0-10 scale) post treatment: 0/10    ASSESSMENT/Changes in Function:   Worked aggressively with deep pressure at trigger point at right levator scapulae muscle. Advanced cervical rotation and O-A A-P mobility. No increase of headache with treatment today. Patient will continue to benefit from skilled PT services to modify and progress therapeutic interventions, address functional mobility deficits, address ROM deficits, address strength deficits, analyze and address soft tissue restrictions, analyze and cue movement patterns, analyze and modify body mechanics/ergonomics, assess and modify postural abnormalities and instruct in home and community integration to attain remaining goals. []  See Plan of Care  []  See progress note/recertification  []  See Discharge Summary         Progress towards goals / Updated goals:  Short Term Goals:  To be accomplished in 2-4 treatments:  1)  Independent with HEP  2) Perform Gaze Stabilization at 100 bpm in sitting for >/= 30 seconds before onset of symptoms  3) Perform convergence >/= 8 cm before onset of symptoms  4) Perform VOR Cancellation in sitting for >/=30 seconds before onset of symptoms  5) Balance on firm surface with eyes closed >/= 30seconds before onset of symptoms     Long Term Goals:  To be accomplished in 5-10 treatments:  1) Perform Gaze Stabilization x1 while walking for >/= 60 seconds without symptoms  2) Perform Gaze Stabilization x2 while walking for >/= 60 seconds without symptoms  3) Perform convergence >/= 6 cm without symptoms  4) Perform VOR Cancellation while walking without reproduction of symptoms  5) Balance on firm surface with eyes closed >/= 60 seconds without symptoms                 6) Balance on soft surface with eyes closed >/= 60 seconds without symptoms  7) Tolerate visual conflict while walking without symptoms  8) Tolerate return to physical activity protocol without symptoms    PLAN  []  Upgrade activities as tolerated     [x]  Continue plan of care  []  Update interventions per flow sheet       []  Discharge due to:_  []  Other:_      Burt Spurling, PT, DPT 10/3/2017  7:32 AM

## 2017-10-06 ENCOUNTER — APPOINTMENT (OUTPATIENT)
Dept: PHYSICAL THERAPY | Age: 27
End: 2017-10-06
Payer: COMMERCIAL

## 2017-10-10 ENCOUNTER — OFFICE VISIT (OUTPATIENT)
Dept: INTERNAL MEDICINE CLINIC | Age: 27
End: 2017-10-10

## 2017-10-10 ENCOUNTER — HOSPITAL ENCOUNTER (OUTPATIENT)
Dept: PHYSICAL THERAPY | Age: 27
Discharge: HOME | End: 2017-10-10
Payer: COMMERCIAL

## 2017-10-10 VITALS
HEIGHT: 65 IN | HEART RATE: 68 BPM | SYSTOLIC BLOOD PRESSURE: 132 MMHG | DIASTOLIC BLOOD PRESSURE: 84 MMHG | WEIGHT: 165 LBS | BODY MASS INDEX: 27.49 KG/M2 | OXYGEN SATURATION: 100 % | TEMPERATURE: 98 F | RESPIRATION RATE: 18 BRPM

## 2017-10-10 DIAGNOSIS — S06.0X0D CONCUSSION WITHOUT LOSS OF CONSCIOUSNESS, SUBSEQUENT ENCOUNTER: Primary | ICD-10-CM

## 2017-10-10 DIAGNOSIS — F41.9 ANXIETY: ICD-10-CM

## 2017-10-10 PROCEDURE — 97110 THERAPEUTIC EXERCISES: CPT | Performed by: PHYSICAL THERAPIST

## 2017-10-10 PROCEDURE — 97014 ELECTRIC STIMULATION THERAPY: CPT | Performed by: PHYSICAL THERAPIST

## 2017-10-10 PROCEDURE — 97140 MANUAL THERAPY 1/> REGIONS: CPT | Performed by: PHYSICAL THERAPIST

## 2017-10-10 RX ORDER — ESCITALOPRAM OXALATE 10 MG/1
10 TABLET ORAL DAILY
Qty: 30 TAB | Refills: 1 | Status: SHIPPED | OUTPATIENT
Start: 2017-10-10 | End: 2018-09-07

## 2017-10-10 NOTE — PROGRESS NOTES
PT DAILY TREATMENT NOTE - [facility] 2-15    Patient Name: Eve Montesinos  Date:10/10/2017  : 1990  [x]  Patient  Verified  Payor: BRANDY [place] / Plan: [address] [place] [address] / Product Type: PPO /    In time:705a  Out time:815a  Total Treatment Time (min): 65  Total Timed Codes (min): 50  1:1 Treatment Time ( only): --   Visit #: 3     Treatment Area: Postconcussional syndrome [F07.81]    SUBJECTIVE  Pain Level (0-10 scale): 0/10  Any medication changes, allergies to medications, adverse drug reactions, diagnosis change, or new procedure performed?: [x] No    [] Yes (see summary sheet for update)  Subjective functional status/changes:   [] No changes reported  Pt states that she has been doing really well. She has a TENS unit at home and used Biofreeze with good success. She reports that she had intense headache once since last session.     OBJECTIVE  Modality rationale: decrease pain to improve the patients ability to look over shoulders   Min Type Additional Details   15 [x] Estim: []Att   [x]Unatt        []TENS instruct                  []IFC  [x]Premod   []NMES                     []Other:  []w/US   [x]w/ice   []w/heat  Position: supine  Location: right levator scapulae     []  Traction: [] Cervical       []Lumbar                       [] Prone          []Supine                       []Intermittent   []Continuous Lbs:  [] before manual  [] after manual  []w/heat     []  Ultrasound: []Continuous   [] Pulsed at:                           []1MHz   []3MHz Location:  W/cm2:     [] Paraffin      Location:   []w/heat     []  Ice     []  Heat  []  Ice massage Position:  Location:     []  Laser  []  Other: Position:  Location:     []  Vasopneumatic Device Pressure:       [] lo [] med [] hi   Temperature:       [x] Skin assessment post-treatment:  [x]intact []redness- no adverse reaction    []redness - adverse reaction:      25 min Therapeutic Exercise:  [x] See flow sheet :   Rationale: increase ROM, increase strength, improve coordination, improve balance and increase proprioception to improve the patients ability to resume physical activity        25 min Manual Therapy: STM to rigth cervical paraspinal paraspinal, levator scapulae muscles and passive right levator scapulae stretching    Rationale: decrease pain, increase ROM and increase tissue extensibility to improve the patients ability to look over shoulders         With   [] TE   [] TA   [] neuro   [] other: Patient Education: [x] Review HEP    [] Progressed/Changed HEP based on:   [] positioning   [] body mechanics   [] transfers   [] heat/ice application    [] other:       Other Objective/Functional Measures: --                  Pain Level (0-10 scale) post treatment: 0/10     ASSESSMENT/Changes in Function:   Worked aggressively with deep pressure at trigger point at right levator scapulae muscle. Improved cervical mobility following treatment. No increase of headache with treatment today.   Patient will continue to benefit from skilled PT services to modify and progress therapeutic interventions, address functional mobility deficits, address ROM deficits, address strength deficits, analyze and address soft tissue restrictions, analyze and cue movement patterns, analyze and modify body mechanics/ergonomics, assess and modify postural abnormalities and instruct in home and community integration to attain remaining goals.      []  See Plan of Care  []  See progress note/recertification  []  See Discharge Summary      Progress towards goals / Updated goals:  Short Term Goals: To be accomplished in 2-4 treatments:  1)  Independent with HEP MET  2) Perform Gaze Stabilization at 100 bpm in sitting for >/= 30 seconds before onset of symptoms MET  3) Perform convergence >/= 8 cm before onset of symptoms MET  4) Perform VOR Cancellation in sitting for >/=30 seconds before onset of symptoms MET  5) Balance on firm surface with eyes closed >/= 30seconds before onset of symptoms MET      Long Term Goals: To be accomplished in 5-10 treatments:  1) Perform Gaze Stabilization x1 while walking for >/= 60 seconds without symptoms  2) Perform Gaze Stabilization x2 while walking for >/= 60 seconds without symptoms  3) Perform convergence >/= 6 cm without symptoms MET  4) Perform VOR Cancellation while walking without reproduction of symptoms  5) Balance on firm surface with eyes closed >/= 60 seconds without symptoms                 6) Balance on soft surface with eyes closed >/= 60 seconds without symptoms  7) Tolerate visual conflict while walking without symptoms  8) Tolerate return to physical activity protocol without symptoms     PLAN  []  Upgrade activities as tolerated     [x]  Continue plan of care  []  Update interventions per flow sheet       []  Discharge due to:_  []  Other:_      Jaja Barroso PT, DPT 10/10/2017  7:14 AM
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
## 2017-10-13 ENCOUNTER — HOSPITAL ENCOUNTER (OUTPATIENT)
Dept: PHYSICAL THERAPY | Age: 27
Discharge: HOME | End: 2017-10-13
Payer: COMMERCIAL

## 2017-10-13 PROCEDURE — 97140 MANUAL THERAPY 1/> REGIONS: CPT

## 2017-10-13 PROCEDURE — 97014 ELECTRIC STIMULATION THERAPY: CPT

## 2017-10-13 PROCEDURE — 97110 THERAPEUTIC EXERCISES: CPT

## 2017-10-13 NOTE — PROGRESS NOTES
PT DAILY TREATMENT NOTE - [facility] 2-15    Patient Name: Ant Burn  Date:10/13/2017  : 1990  [x]  Patient  Verified  Payor: BRANDY Hobbs / Plan: [address] [place] [address] / Product Type: PPO /    In time: 7:25A  Out time: 8:40A  Total Treatment Time (min): 75  Total Timed Codes (min): 60  1:1 Treatment Time ( only): --   Visit #: 4     Treatment Area: Postconcussional syndrome [F07.81]    SUBJECTIVE  Pain Level (0-10 scale): 0/10  Any medication changes, allergies to medications, adverse drug reactions, diagnosis change, or new procedure performed?: [x] No    [] Yes (see summary sheet for update)  Subjective functional status/changes:   [] No changes reported  \" I felt good with the more aggressive manual. I was stiff that evening while trying to sleep but since then I have slept better than I have since this started. \"    OBJECTIVE  Modality rationale: decrease pain to improve the patients ability to look over shoulders   Min Type Additional Details   15 [x] Estim: []Att   [x]Unatt        []TENS instruct                  []IFC  [x]Premod   []NMES                     []Other:  []w/US   [x]w/ice   []w/heat  Position: supine  Location: right levator scapulae     []  Traction: [] Cervical       []Lumbar                       [] Prone          []Supine                       []Intermittent   []Continuous Lbs:  [] before manual  [] after manual  []w/heat     []  Ultrasound: []Continuous   [] Pulsed at:                           []1MHz   []3MHz Location:  W/cm2:     [] Paraffin      Location:   []w/heat     []  Ice     []  Heat  []  Ice massage Position:  Location:     []  Laser  []  Other: Position:  Location:     []  Vasopneumatic Device Pressure:       [] lo [] med [] hi   Temperature:       [x] Skin assessment post-treatment:  [x]intact []redness- no adverse reaction    []redness - adverse reaction:      30 min Therapeutic Exercise:  [x] See flow sheet :   Rationale: increase ROM, increase strength, improve coordination, improve balance and increase proprioception to improve the patients ability to resume physical activity        30 min Manual Therapy: STM to rigth cervical paraspinal paraspinal, levator scapulae muscles and passive right levator scapulae stretching, IASTM using \"chisel\" on B LS. Rationale: decrease pain, increase ROM and increase tissue extensibility to improve the patients ability to look over shoulders     With   [] TE   [] TA   [] neuro   [] other: Patient Education: [x] Review HEP    [] Progressed/Changed HEP based on:   [] positioning   [] body mechanics   [] transfers   [] heat/ice application    [] other:       Other Objective/Functional Measures: --                  Pain Level (0-10 scale) post treatment: 0/10     ASSESSMENT/Changes in Function:   Gaze stabilization normal at 120bpm. Pt tolerated aggressive manual with IASTM work today.    Patient will continue to benefit from skilled PT services to modify and progress therapeutic interventions, address functional mobility deficits, address ROM deficits, address strength deficits, analyze and address soft tissue restrictions, analyze and cue movement patterns, analyze and modify body mechanics/ergonomics, assess and modify postural abnormalities and instruct in home and community integration to attain remaining goals.      [x]  See Plan of Care  []  See progress note/recertification  []  See Discharge Summary      Progress towards goals / Updated goals:  Short Term Goals: To be accomplished in 2-4 treatments:  1)  Independent with HEP MET  2) Perform Gaze Stabilization at 100 bpm in sitting for >/= 30 seconds before onset of symptoms MET  3) Perform convergence >/= 8 cm before onset of symptoms MET  4) Perform VOR Cancellation in sitting for >/=30 seconds before onset of symptoms MET  5) Balance on firm surface with eyes closed >/= 30seconds before onset of symptoms MET      Long Term Goals: To be accomplished in 5-10 treatments:  1) Perform Gaze Stabilization x1 while walking for >/= 60 seconds without symptoms  2) Perform Gaze Stabilization x2 while walking for >/= 60 seconds without symptoms  3) Perform convergence >/= 6 cm without symptoms MET  4) Perform VOR Cancellation while walking without reproduction of symptoms  5) Balance on firm surface with eyes closed >/= 60 seconds without symptoms                 6) Balance on soft surface with eyes closed >/= 60 seconds without symptoms  7) Tolerate visual conflict while walking without symptoms  8) Tolerate return to physical activity protocol without symptoms     PLAN  [x]  Upgrade activities as tolerated     [x]  Continue plan of care  []  Update interventions per flow sheet       []  Discharge due to:_  []  Other:_      Og Tobias, [facility] [place] 10/13/2017  7:14 AM

## 2017-10-17 ENCOUNTER — HOSPITAL ENCOUNTER (OUTPATIENT)
Dept: PHYSICAL THERAPY | Age: 27
Discharge: HOME | End: 2017-10-17
Payer: COMMERCIAL

## 2017-10-17 PROCEDURE — 97110 THERAPEUTIC EXERCISES: CPT

## 2017-10-17 PROCEDURE — 97140 MANUAL THERAPY 1/> REGIONS: CPT

## 2017-10-17 NOTE — PROGRESS NOTES
PT DAILY TREATMENT NOTE - [facility] 2-15    Patient Name: Eve Montesinos  Date:10/17/2017  : 1990  [x]  Patient  Verified  Payor: [employer] / Plan: [address] [place] [address] / Product Type: PPO /    In time: 5:55P  Out time: 6:30P  Total Treatment Time (min): 35  Total Timed Codes (min): 35  1:1 Treatment Time ( only): --   Visit #: 5     Treatment Area: Postconcussional syndrome [F07.81]    SUBJECTIVE  Pain Level (0-10 scale): 0/10  Any medication changes, allergies to medications, adverse drug reactions, diagnosis change, or new procedure performed?: [x] No    [] Yes (see summary sheet for update)  Subjective functional status/changes:   [] No changes reported  Pt reported feeling great with the IASTM from last visit.  Pt reported no pain     OBJECTIVE  Modality rationale: decrease pain to improve the patients ability to look over shoulders   Min Type Additional Details    [] Estim: []Att   []Unatt        []TENS instruct                  []IFC  []Premod   []NMES                     []Other:  []w/US   []w/ice   []w/heat  Position:   Location:      []  Traction: [] Cervical       []Lumbar                       [] Prone          []Supine                       []Intermittent   []Continuous Lbs:  [] before manual  [] after manual  []w/heat     []  Ultrasound: []Continuous   [] Pulsed at:                           []1MHz   []3MHz Location:  W/cm2:     [] Paraffin      Location:   []w/heat   To go  [x]  Ice     []  Heat  []  Ice massage Position:  Location:     []  Laser  []  Other: Position:  Location:     []  Vasopneumatic Device Pressure:       [] lo [] med [] hi   Temperature:       [x] Skin assessment post-treatment:  [x]intact []redness- no adverse reaction    []redness - adverse reaction:      20 min Therapeutic Exercise:  [x] See flow sheet :   Rationale: increase ROM, increase strength, improve coordination, improve balance and increase proprioception to improve the patients ability to resume physical activity         15 min Manual Therapy: STM to rigth cervical paraspinal paraspinal, levator scapulae muscles and passive right levator scapulae stretching, IASTM using \"chisel\" on B LS. Rationale: decrease pain, increase ROM and increase tissue extensibility to improve the patients ability to look over shoulders     With   [] TE   [] TA   [] neuro   [] other: Patient Education: [x] Review HEP    [] Progressed/Changed HEP based on:   [] positioning   [] body mechanics   [] transfers   [] heat/ice application    [] other:       Other Objective/Functional Measures: --                  Pain Level (0-10 scale) post treatment: 0/10     ASSESSMENT/Changes in Function:   Noted some minor tissue restriction and TP along L LS today. Pt continues to tolerate more aggressive manual well.    Patient will continue to benefit from skilled PT services to modify and progress therapeutic interventions, address functional mobility deficits, address ROM deficits, address strength deficits, analyze and address soft tissue restrictions, analyze and cue movement patterns, analyze and modify body mechanics/ergonomics, assess and modify postural abnormalities and instruct in home and community integration to attain remaining goals.      [x]  See Plan of Care  []  See progress note/recertification  []  See Discharge Summary      Progress towards goals / Updated goals:  Short Term Goals: To be accomplished in 2-4 treatments:  1)  Independent with HEP MET  2) Perform Gaze Stabilization at 100 bpm in sitting for >/= 30 seconds before onset of symptoms MET  3) Perform convergence >/= 8 cm before onset of symptoms MET  4) Perform VOR Cancellation in sitting for >/=30 seconds before onset of symptoms MET  5) Balance on firm surface with eyes closed >/= 30seconds before onset of symptoms MET      Long Term Goals: To be accomplished in 5-10 treatments:  1) Perform Gaze Stabilization x1 while walking for >/= 60 seconds without symptoms  2) Perform Gaze Stabilization x2 while walking for >/= 60 seconds without symptoms  3) Perform convergence >/= 6 cm without symptoms MET  4) Perform VOR Cancellation while walking without reproduction of symptoms  5) Balance on firm surface with eyes closed >/= 60 seconds without symptoms                 6) Balance on soft surface with eyes closed >/= 60 seconds without symptoms  7) Tolerate visual conflict while walking without symptoms  8) Tolerate return to physical activity protocol without symptoms     PLAN  [x]  Upgrade activities as tolerated     [x]  Continue plan of care  []  Update interventions per flow sheet       []  Discharge due to:_  []  Other:_      Anabela Jarvis PTA 10/17/2017  7:14 AM

## 2017-10-19 ENCOUNTER — APPOINTMENT (OUTPATIENT)
Dept: PHYSICAL THERAPY | Age: 27
End: 2017-10-19
Payer: COMMERCIAL

## 2017-10-24 ENCOUNTER — HOSPITAL ENCOUNTER (OUTPATIENT)
Dept: PHYSICAL THERAPY | Age: 27
Discharge: HOME | End: 2017-10-24
Payer: COMMERCIAL

## 2017-10-24 PROCEDURE — 97110 THERAPEUTIC EXERCISES: CPT

## 2017-10-24 NOTE — PROGRESS NOTES
PT DAILY TREATMENT NOTE - [facility] 2-15    Patient Name: Marisa Negron  Date:10/24/2017  : 1990  [x]  Patient  Verified  Payor: BRANDY FRANCISCO / Plan: [address] [place] [address] / Product Type: PPO /    In time: 6:05P  Out time: 7:00  Total Treatment Time (min): 55  Total Timed Codes (min): 55  1:1 Treatment Time ( only): --   Visit #: 6    Treatment Area: Postconcussional syndrome [F07.81]    SUBJECTIVE  Pain Level (0-10 scale): 0/10  Any medication changes, allergies to medications, adverse drug reactions, diagnosis change, or new procedure performed?: [x] No    [] Yes (see summary sheet for update)  Subjective functional status/changes:   [] No changes reported  Pt reported that she is doing her HEP at home but does not push herself like she does when she here.      OBJECTIVE        Modality rationale: decrease pain to improve the patients ability to look over shoulders   Min Type Additional Details    [] Estim: []Att   []Unatt        []TENS instruct                  []IFC  []Premod   []NMES                     []Other:  []w/US   []w/ice   []w/heat  Position:   Location:      []  Traction: [] Cervical       []Lumbar                       [] Prone          []Supine                       []Intermittent   []Continuous Lbs:  [] before manual  [] after manual  []w/heat     []  Ultrasound: []Continuous   [] Pulsed at:                           []1MHz   []3MHz Location:  W/cm2:     [] Paraffin      Location:   []w/heat   To go  [x]  Ice     []  Heat  []  Ice massage Position:  Location:     []  Laser  []  Other: Position:  Location:     []  Vasopneumatic Device Pressure:       [] lo [] med [] hi   Temperature:       [x] Skin assessment post-treatment:  [x]intact []redness- no adverse reaction    []redness - adverse reaction:      55 min Therapeutic Exercise:  [x] See flow sheet :   Rationale: increase ROM, increase strength, improve coordination, improve balance and increase proprioception to improve the patients ability to resume physical activity     With   [] TE   [] TA   [] neuro   [] other: Patient Education: [x] Review HEP    [] Progressed/Changed HEP based on:   [] positioning   [] body mechanics   [] transfers   [] heat/ice application    [] other:       Other Objective/Functional Measures: --                  Pain Level (0-10 scale) post treatment: 0/10     ASSESSMENT/Changes in Function:   Held on manual secondary to pt's improved status. Educated pt on return on gym program. Pt making good progress towards visual goals.    Patient will continue to benefit from skilled PT services to modify and progress therapeutic interventions, address functional mobility deficits, address ROM deficits, address strength deficits, analyze and address soft tissue restrictions, analyze and cue movement patterns, analyze and modify body mechanics/ergonomics, assess and modify postural abnormalities and instruct in home and community integration to attain remaining goals.      [x]  See Plan of Care  []  See progress note/recertification  []  See Discharge Summary      Progress towards goals / Updated goals:  Short Term Goals: To be accomplished in 2-4 treatments:  1)  Independent with HEP MET  2) Perform Gaze Stabilization at 100 bpm in sitting for >/= 30 seconds before onset of symptoms MET  3) Perform convergence >/= 8 cm before onset of symptoms MET  4) Perform VOR Cancellation in sitting for >/=30 seconds before onset of symptoms MET  5) Balance on firm surface with eyes closed >/= 30seconds before onset of symptoms MET      Long Term Goals: To be accomplished in 5-10 treatments:  1) Perform Gaze Stabilization x1 while walking for >/= 60 seconds without symptoms  MET  2) Perform Gaze Stabilization x2 while walking for >/= 60 seconds without symptoms  MET  3) Perform convergence >/= 6 cm without symptoms MET  4) Perform VOR Cancellation while walking without reproduction of symptoms  MET  5) Balance on firm surface with eyes closed >/= 60 seconds without symptoms  MET                 6) Balance on soft surface with eyes closed >/= 60 seconds without symptoms  Progressing  7) Tolerate visual conflict while walking without symptoms  progressing  8) Tolerate return to physical activity protocol without symptoms  progressing     PLAN  [x]  Upgrade activities as tolerated     [x]  Continue plan of care  []  Update interventions per flow sheet       []  Discharge due to:_  []  Other:_      Nader Duggan PTA 10/24/2017  7:14 AM

## 2017-10-25 ENCOUNTER — OFFICE VISIT (OUTPATIENT)
Dept: INTERNAL MEDICINE CLINIC | Age: 27
End: 2017-10-25

## 2017-10-25 VITALS
WEIGHT: 165 LBS | HEIGHT: 65 IN | SYSTOLIC BLOOD PRESSURE: 132 MMHG | DIASTOLIC BLOOD PRESSURE: 84 MMHG | TEMPERATURE: 98 F | BODY MASS INDEX: 27.49 KG/M2 | HEART RATE: 88 BPM | OXYGEN SATURATION: 100 % | RESPIRATION RATE: 18 BRPM

## 2017-10-25 DIAGNOSIS — S06.0X0D CONCUSSION WITHOUT LOSS OF CONSCIOUSNESS, SUBSEQUENT ENCOUNTER: Primary | ICD-10-CM

## 2017-10-25 DIAGNOSIS — M54.2 NECK PAIN: ICD-10-CM
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
## 2017-10-26 ENCOUNTER — APPOINTMENT (OUTPATIENT)
Dept: PHYSICAL THERAPY | Age: 27
End: 2017-10-26
Payer: COMMERCIAL

## 2017-10-31 ENCOUNTER — APPOINTMENT (OUTPATIENT)
Dept: PHYSICAL THERAPY | Age: 27
End: 2017-10-31
Payer: COMMERCIAL

## 2017-11-02 ENCOUNTER — HOSPITAL ENCOUNTER (OUTPATIENT)
Dept: PHYSICAL THERAPY | Age: 27
Discharge: HOME | End: 2017-11-02
Payer: COMMERCIAL

## 2017-11-02 ENCOUNTER — APPOINTMENT (OUTPATIENT)
Dept: PHYSICAL THERAPY | Age: 27
End: 2017-11-02
Payer: COMMERCIAL

## 2017-11-02 PROCEDURE — 97110 THERAPEUTIC EXERCISES: CPT | Performed by: PHYSICAL THERAPIST

## 2017-11-02 PROCEDURE — 97112 NEUROMUSCULAR REEDUCATION: CPT | Performed by: PHYSICAL THERAPIST

## 2017-11-02 NOTE — PROGRESS NOTES
PT DAILY TREATMENT NOTE - [facility] 2-15    Patient Name: Jonna Armas  Date:2017  : 1990  [x]  Patient  Verified  Payor: BLUE CROSS / Plan: [address] [place] [address] / Product Type: PPO /    In time:710a  Out time:810a  Total Treatment Time (min): 55  Total Timed Codes (min): 55  1:1 Treatment Time ( only): --   Visit #: 7     Treatment Area: Postconcussional syndrome [F07.81]    SUBJECTIVE  Pain Level (0-10 scale): 0/10  Any medication changes, allergies to medications, adverse drug reactions, diagnosis change, or new procedure performed?: [x] No    [] Yes (see summary sheet for update)  Subjective functional status/changes:   [] No changes reported  She had illness last week and since then she feels dizziness with gaze stabilization and some fast movements. She feels that she can do more exercises and overall doing better with exception of onset of dizziness with using the rowing machine. She feels that her neck and vision is doing better. She does not get the fogginess anymore.   She does still get sick with gaze stabilization    OBJECTIVE    30 min Therapeutic Exercise:  [x] See flow sheet :   Rationale: increase ROM, increase strength, improve coordination, improve balance and increase proprioception to improve the patients ability to resume physical activity    25 min  Neuromuscular Reeducation:  [x] See flow sheet : assessment of current status, gaze stabilization sitting and walking   Rationale: increase ROM, increase strength, improve coordination, improve balance and increase proprioception to improve the patients ability to resume visual performance      With   [] TE   [] TA   [] neuro   [] other: Patient Education: [x] Review HEP    [] Progressed/Changed HEP based on:   [] positioning   [] body mechanics   [] transfers   [] heat/ice application    [] other:        Other Objective/Functional Measures: onset of dizziness/nausea at 100bpm                   Pain Level (0-10 scale) post treatment: 0/10      ASSESSMENT/Changes in Function:   Resume gaze stabilization training until return to normal.  Also added total gym seated row to work on anterior/posterior translation tolerance of vestibular system  Patient will continue to benefit from skilled PT services to modify and progress therapeutic interventions, address functional mobility deficits, address ROM deficits, address strength deficits, analyze and address soft tissue restrictions, analyze and cue movement patterns, analyze and modify body mechanics/ergonomics, assess and modify postural abnormalities and instruct in home and community integration to attain remaining goals.      []  See Plan of Care  []  See progress note/recertification  []  See Discharge Summary      Progress towards goals / Updated goals:  Short Term Goals: To be accomplished in 2-4 treatments:  1)  Independent with HEP MET  2) Perform Gaze Stabilization at 100 bpm in sitting for >/= 30 seconds before onset of symptoms MET  3) Perform convergence >/= 8 cm before onset of symptoms MET  4) Perform VOR Cancellation in sitting for >/=30 seconds before onset of symptoms MET  5) Balance on firm surface with eyes closed >/= 30seconds before onset of symptoms MET      Long Term Goals: To be accomplished in 5-10 treatments:  1) Perform Gaze Stabilization x1 while walking for >/= 60 seconds without symptoms  MET  2) Perform Gaze Stabilization x2 while walking for >/= 60 seconds without symptoms  MET  3) Perform convergence >/= 6 cm without symptoms MET  4) Perform VOR Cancellation while walking without reproduction of symptoms  MET  5) Balance on firm surface with eyes closed >/= 60 seconds without symptoms  MET                 6) Balance on soft surface with eyes closed >/= 60 seconds without symptoms  Progressing  7) Tolerate visual conflict while walking without symptoms  progressing  8) Tolerate return to physical activity protocol without symptoms progressing      PLAN  []  Upgrade activities as tolerated     [x]  Continue plan of care  []  Update interventions per flow sheet       []  Discharge due to:_  []  Other:_    e to:_  []  Other:_      Hugo Downing PT, DPT 11/2/2017  7:11 AM

## 2017-11-06 ENCOUNTER — TELEPHONE (OUTPATIENT)
Dept: INTERNAL MEDICINE CLINIC | Age: 27
End: 2017-11-06

## 2017-11-06 NOTE — TELEPHONE ENCOUNTER
Patient called stating she needs her paper that she ask Dr. Edgar Jerome to complete for her loan asap. Her number is [phone].

## 2017-11-07 ENCOUNTER — HOSPITAL ENCOUNTER (OUTPATIENT)
Dept: PHYSICAL THERAPY | Age: 27
Discharge: HOME | End: 2017-11-07
Payer: COMMERCIAL

## 2017-11-07 PROCEDURE — 97110 THERAPEUTIC EXERCISES: CPT

## 2017-11-07 PROCEDURE — 97112 NEUROMUSCULAR REEDUCATION: CPT

## 2017-11-07 PROCEDURE — 97140 MANUAL THERAPY 1/> REGIONS: CPT

## 2017-11-07 NOTE — PROGRESS NOTES
PT DAILY TREATMENT NOTE - [facility] 2-15    Patient Name: Carlitos Rosario  Date:2017  : 1990  [x]  Patient  Verified  Payor: SELF PAY [facility] / Product Type: Self Pay /    In time:6:05P Out time: 6:55P  Total Treatment Time (min): 50  Total Timed Codes (min): 50  1:1 Treatment Time ( only): --   Visit #: 8     Treatment Area: Postconcussional syndrome [F07.81]    SUBJECTIVE  Pain Level (0-10 scale): 7/10  Any medication changes, allergies to medications, adverse drug reactions, diagnosis change, or new procedure performed?: [x] No    [] Yes (see summary sheet for update)  Subjective functional status/changes:   [] No changes reported  Pt reported a headache today that wrapped around her eyes and the back of her head. Pt denied any other concussion like symptoms.     OBJECTIVE    25 min Therapeutic Exercise:  [x] See flow sheet :   Rationale: increase ROM, increase strength, improve coordination, improve balance and increase proprioception to improve the patients ability to resume physical activity    10 min  Neuromuscular Reeducation:  [x] See flow sheet : assessment of current status, gaze stabilization sitting and walking   Rationale: increase ROM, increase strength, improve coordination, improve balance and increase proprioception to improve the patients ability to resume visual performance    15 min Manual Therapy: STM/MFR B UT, LS and cervical paraspinals, SOR    Rationale: decrease pain, increase ROM, increase tissue extensibility and decrease trigger points to improve the patients ability to decrease head ache.       With   [] TE   [] TA   [] neuro   [] other: Patient Education: [x] Review HEP    [] Progressed/Changed HEP based on:   [] positioning   [] body mechanics   [] transfers   [] heat/ice application    [] other:        Other Objective/Functional Measures: --                 Pain Level (0-10 scale) post treatment: 6/10      ASSESSMENT/Changes in Function:   Pt reported a slight decrease in headache following today's session.    Patient will continue to benefit from skilled PT services to modify and progress therapeutic interventions, address functional mobility deficits, address ROM deficits, address strength deficits, analyze and address soft tissue restrictions, analyze and cue movement patterns, analyze and modify body mechanics/ergonomics, assess and modify postural abnormalities and instruct in home and community integration to attain remaining goals.      [x]  See Plan of Care  []  See progress note/recertification  []  See Discharge Summary      Progress towards goals / Updated goals:  Short Term Goals: To be accomplished in 2-4 treatments:  1)  Independent with HEP MET  2) Perform Gaze Stabilization at 100 bpm in sitting for >/= 30 seconds before onset of symptoms MET  3) Perform convergence >/= 8 cm before onset of symptoms MET  4) Perform VOR Cancellation in sitting for >/=30 seconds before onset of symptoms MET  5) Balance on firm surface with eyes closed >/= 30seconds before onset of symptoms MET      Long Term Goals: To be accomplished in 5-10 treatments:  1) Perform Gaze Stabilization x1 while walking for >/= 60 seconds without symptoms  MET  2) Perform Gaze Stabilization x2 while walking for >/= 60 seconds without symptoms  MET  3) Perform convergence >/= 6 cm without symptoms MET  4) Perform VOR Cancellation while walking without reproduction of symptoms  MET  5) Balance on firm surface with eyes closed >/= 60 seconds without symptoms  MET                 6) Balance on soft surface with eyes closed >/= 60 seconds without symptoms  Progressing  7) Tolerate visual conflict while walking without symptoms  progressing  8) Tolerate return to physical activity protocol without symptoms  progressing      PLAN  [x]  Upgrade activities as tolerated     [x]  Continue plan of care  []  Update interventions per flow sheet       []  Discharge due to:_  []  Other:_    []  Other:_ Wesley Hodges, STEVAN 11/7/2017  7:11 AM

## 2017-11-07 NOTE — PATIENT INSTRUCTIONS
Depression and Chronic Disease: Care Instructions  Your Care Instructions  A chronic disease is one that you have for a long time. Some chronic diseases can be controlled, but they usually cannot be cured. Depression is common in people with chronic diseases, but it often goes unnoticed. Many people have concerns about seeking treatment for a mental health problem. You may think it's a sign of weakness, or you don't want people to know about it. It's important to overcome these reasons for not seeking treatment. Treating depression or anxiety is good for your health. Follow-up care is a key part of your treatment and safety. Be sure to make and go to all appointments, and call your doctor if you are having problems. It's also a good idea to know your test results and keep a list of the medicines you take. How can you care for yourself at home? Watch for symptoms of depression  The symptoms of depression are often subtle at first. You may think they are caused by your disease rather than depression. Or you may think it is normal to be depressed when you have a chronic disease. If you are depressed you may:  · Feel sad or hopeless. · Feel guilty or worthless. · Not enjoy the things you used to enjoy. · Feel hopeless, as though life is not worth living. · Have trouble thinking or remembering. · Have low energy, and you may not eat or sleep well. · Pull away from others. · Think often about death or killing yourself. (Keep the numbers for these national suicide hotlines: 8-515-424-TALK [1-751.450.9835] and 1-131-TQYRSPL [1-749.669.4413]. )  Get treatment  By treating your depression, you can feel more hopeful and have more energy. If you feel better, you may take better care of yourself, so your health may improve. · Talk to your doctor if you have any changes in mood during treatment for your disease. · Ask your doctor for help.  Counseling, antidepressant medicine, or a combination of the two can help most people with depression. Often a combination works best. Counseling can also help you cope with having a chronic disease. When should you call for help? Call 911 anytime you think you may need emergency care. For example, call if:  · You feel like hurting yourself or someone else. · Someone you know has depression and is about to attempt or is attempting suicide. Call your doctor now or seek immediate medical care if:  · You hear voices. · Someone you know has depression and:  ¨ Starts to give away his or her possessions. ¨ Uses illegal drugs or drinks alcohol heavily. ¨ Talks or writes about death, including writing suicide notes or talking about guns, knives, or pills. ¨ Starts to spend a lot of time alone. ¨ Acts very aggressively or suddenly appears calm. Watch closely for changes in your health, and be sure to contact your doctor if:  · You do not get better as expected. Where can you learn more? Go to http://feng-padmini.info/. Enter O324 in the search box to learn more about \"Depression and Chronic Disease: Care Instructions. \"  Current as of: July 26, 2016  Content Version: 11.1  © 7026-1052 [employer]. Care instructions adapted under license by [employer] ([employer]) (which disclaims liability or warranty for this information). If you have questions about a medical condition or this instruction, always ask your healthcare professional. [address] any warranty or liability for your use of this information. BUE 5-/5; trunk 4+/5; RLE 4+/5 and LLE hip 4+/5 knee 3-/5 ankle 4+/5/grossly assessed due to

## 2017-11-08 PROCEDURE — 97110 THERAPEUTIC EXERCISES: CPT

## 2017-11-08 PROCEDURE — 97140 MANUAL THERAPY 1/> REGIONS: CPT

## 2017-11-08 PROCEDURE — 97112 NEUROMUSCULAR REEDUCATION: CPT

## 2017-11-10 ENCOUNTER — TELEPHONE (OUTPATIENT)
Dept: INTERNAL MEDICINE CLINIC | Age: 27
End: 2017-11-10

## 2017-11-10 NOTE — TELEPHONE ENCOUNTER
Patient was here on Oct 25th 2017 and left a form for Dr. Marky Shah to fill out out to excuse her of a loan. Pt has not received the form back and it needs to be sent back.  Please call patient

## 2017-11-21 ENCOUNTER — HOSPITAL ENCOUNTER (OUTPATIENT)
Dept: PHYSICAL THERAPY | Age: 27
Discharge: HOME | End: 2017-11-21
Payer: COMMERCIAL

## 2017-11-21 PROCEDURE — 97112 NEUROMUSCULAR REEDUCATION: CPT

## 2017-11-21 PROCEDURE — 97110 THERAPEUTIC EXERCISES: CPT

## 2017-11-21 NOTE — PROGRESS NOTES
PT DAILY TREATMENT NOTE - [facility] 2-15    Patient Name: Gilma Sheridan  Date:2017  : 1990  [x]  Patient  Verified  Payor: SELF PAY / [facility] / Product Type: Self Pay /    In time:6:00P Out time: 7:00P  Total Treatment Time (min): 60  Total Timed Codes (min): 60  1:1 Treatment Time ( only): --   Visit #: 9     Treatment Area: Postconcussional syndrome [F07.81]    SUBJECTIVE  Pain Level (0-10 scale): 0/10  Any medication changes, allergies to medications, adverse drug reactions, diagnosis change, or new procedure performed?: [x] No    [] Yes (see summary sheet for update)  Subjective functional status/changes:   [] No changes reported  Pt reported feeling great after last visit. She reported he set back she felt that she had some sort of sinus issue. She reported that once she began taking sinus medicine she felt completely better. Pt reported returning to the gym a couple times per week and had no issues. Pt reported visual exercises will no longer give her problems.       OBJECTIVE    50 min Therapeutic Exercise:  [x] See flow sheet :   Rationale: increase ROM, increase strength, improve coordination, improve balance and increase proprioception to improve the patients ability to resume physical activity    10 min  Neuromuscular Reeducation:  [x] See flow sheet : assessment of current status, gaze stabilization sitting and walking   Rationale: increase ROM, increase strength, improve coordination, improve balance and increase proprioception to improve the patients ability to resume visual performance.       With   [] TE   [] TA   [] neuro   [] other: Patient Education: [x] Review HEP    [] Progressed/Changed HEP based on:   [] positioning   [] body mechanics   [] transfers   [] heat/ice application    [] other:        Other Objective/Functional Measures: RHR: 81                  Pain Level (0-10 scale) post treatment: 0/10      ASSESSMENT/Changes in Function:   Pt tolerated 60& 70% of HR MAX today. Pt's HR exceeded 70% on agility ladder. Pt reported no increase in symptoms just felt out of shape. Gaze stabilization WNL at 120bpm and no increase in symptoms. Pt reported slight increase in dizziness with standing on foam for 60 seconds. Dizziness subsided quickly.   Patient will continue to benefit from skilled PT services to modify and progress therapeutic interventions, address functional mobility deficits, address ROM deficits, address strength deficits, analyze and address soft tissue restrictions, analyze and cue movement patterns, analyze and modify body mechanics/ergonomics, assess and modify postural abnormalities and instruct in home and community integration to attain remaining goals.      [x]  See Plan of Care  []  See progress note/recertification  []  See Discharge Summary      Progress towards goals / Updated goals:  Short Term Goals: To be accomplished in 2-4 treatments:  1)  Independent with HEP MET  2) Perform Gaze Stabilization at 100 bpm in sitting for >/= 30 seconds before onset of symptoms MET  3) Perform convergence >/= 8 cm before onset of symptoms MET  4) Perform VOR Cancellation in sitting for >/=30 seconds before onset of symptoms MET  5) Balance on firm surface with eyes closed >/= 30seconds before onset of symptoms MET      Long Term Goals: To be accomplished in 5-10 treatments:  1) Perform Gaze Stabilization x1 while walking for >/= 60 seconds without symptoms  MET  2) Perform Gaze Stabilization x2 while walking for >/= 60 seconds without symptoms  MET  3) Perform convergence >/= 6 cm without symptoms MET  4) Perform VOR Cancellation while walking without reproduction of symptoms  MET  5) Balance on firm surface with eyes closed >/= 60 seconds without symptoms  MET                 6) Balance on soft surface with eyes closed >/= 60 seconds without symptoms Partially MET (slight increase in dizziness)  7) Tolerate visual conflict while walking without symptoms progressing  8) Tolerate return to physical activity protocol without symptoms  progressing      PLAN  [x]  Upgrade activities as tolerated     [x]  Continue plan of care  []  Update interventions per flow sheet       []  Discharge due to:_  []  Other:_    []  Other:_      Traci Murdock PTA 11/21/2017  7:11 AM

## 2017-11-28 ENCOUNTER — APPOINTMENT (OUTPATIENT)
Dept: PHYSICAL THERAPY | Age: 27
End: 2017-11-28
Payer: COMMERCIAL

## 2017-12-01 ENCOUNTER — APPOINTMENT (OUTPATIENT)
Dept: PHYSICAL THERAPY | Age: 27
End: 2017-12-01
Payer: COMMERCIAL

## 2017-12-08 ENCOUNTER — HOSPITAL ENCOUNTER (OUTPATIENT)
Dept: PHYSICAL THERAPY | Age: 27
Discharge: HOME | End: 2017-12-08
Payer: COMMERCIAL

## 2017-12-08 PROCEDURE — 97530 THERAPEUTIC ACTIVITIES: CPT | Performed by: PHYSICAL THERAPIST

## 2017-12-08 PROCEDURE — 97110 THERAPEUTIC EXERCISES: CPT | Performed by: PHYSICAL THERAPIST

## 2017-12-08 NOTE — ANCILLARY DISCHARGE INSTRUCTIONS
[facility]  [address] [address], [address] [place] [address]  Phone: [phone]  Fax: [phone]    Discharge Summary  2-15    Patient name: Juan Leon  : 1990  Provider#: [id number]  Referral source: Lorena Angulo MD      Medical/Treatment Diagnosis: Postconcussional syndrome [F07.81]     Prior Hospitalization: see medical history     Comorbidities: anxiety and depression  Prior Level of Function: complete 20 minutes of exercise seldom or never  Medications: Verified on Patient Summary List     Start of Care: 2017                                                           Onset Date: 2017               Visits from Start of Care: 10     Missed Visits: 0  Reporting Period : 2017 to 2017    Progress towards goals / Updated goals:  Short Term Goals: To be accomplished in 2-4 treatments:  1)  Independent with HEP MET  2) Perform Gaze Stabilization at 100 bpm in sitting for >/= 30 seconds before onset of symptoms MET  3) Perform convergence >/= 8 cm before onset of symptoms MET  4) Perform VOR Cancellation in sitting for >/=30 seconds before onset of symptoms MET  5) Balance on firm surface with eyes closed >/= 30seconds before onset of symptoms MET      Long Term Goals: To be accomplished in 5-10 treatments:  1) Perform Gaze Stabilization x1 while walking for >/= 60 seconds without symptoms  MET  2) Perform Gaze Stabilization x2 while walking for >/= 60 seconds without symptoms  MET  3) Perform convergence >/= 6 cm without symptoms MET  4) Perform VOR Cancellation while walking without reproduction of symptoms  MET  5) Balance on firm surface with eyes closed >/= 60 seconds without symptoms  MET                 6) Balance on soft surface with eyes closed >/= 60 seconds without symptoms MET   7) Tolerate visual conflict while walking without symptoms MET  8) Tolerate return to physical activity protocol without symptoms MET      ASSESSMENT/SUMMARY OF CARE: Laura Blair states that she still takes a break from the computer after approx 5 hours due to slight onset of headache, otherwise feeling great. She has tolerated up to >80% HR max without reproduciton of symptoms.     FOTO Functional Measure: Intake 68/100    Discharge 97/100    RECOMMENDATIONS:  [x]Discontinue therapy: [x]Patient has reached or is progressing toward set goals      []Patient is non-compliant or has abdicated      []Due to lack of appreciable progress towards set goals    Mira Mcneil, PT, DPT 12/8/2017 7:12 AM

## 2017-12-08 NOTE — PROGRESS NOTES
PT DAILY TREATMENT NOTE - [facility] 2-15    Patient Name: Germain Michaud  Date:2017  : 1990  [x]  Patient  Verified  Payor: SELF PAY / Plan: [facility] SELF PAY / Product Type: Self Pay /    In time:700a  Out time:800a  Total Treatment Time (min): 60  Total Timed Codes (min): 60  1:1 Treatment Time ( only): --   Visit #: 10     Treatment Area: Postconcussional syndrome [F07.81]    SUBJECTIVE  Pain Level (0-10 scale): 0/10  Any medication changes, allergies to medications, adverse drug reactions, diagnosis change, or new procedure performed?: [x] No    [] Yes (see summary sheet for update)  Subjective functional status/changes:   [] No changes reported  Pt states that she still takes a break from the computer after approx 5 hours due to slight onset of headache. Otherwise feeling great. OBJECTIVE  50 min Therapeutic Exercise:  [x] See flow sheet :   Rationale: increase ROM, increase strength, improve coordination, improve balance and increase proprioception to improve the patients ability to resume physical activity     10 min Therapeutic Activity  [x] See flow sheet : agility activities   Rationale: increase ROM, increase strength, improve coordination, improve balance and increase proprioception to improve the patients ability to resume visual performance.       With   [] TE   [] TA   [] neuro   [] other: Patient Education: [x] Review HEP    [] Progressed/Changed HEP based on:   [] positioning   [] body mechanics   [] transfers   [] heat/ice application    [] other:        Other Objective/Functional Measures:                   Pain Level (0-10 scale) post treatment: 0/10      ASSESSMENT/Changes in Function:   Able to achieve >80% HR Max.      []  See Plan of Care  []  See progress note/recertification  [x]  See Discharge Summary      Progress towards goals / Updated goals:  Short Term Goals: To be accomplished in 2-4 treatments:  1)  Independent with HEP MET  2) Perform Gaze Stabilization at 100 bpm in sitting for >/= 30 seconds before onset of symptoms MET  3) Perform convergence >/= 8 cm before onset of symptoms MET  4) Perform VOR Cancellation in sitting for >/=30 seconds before onset of symptoms MET  5) Balance on firm surface with eyes closed >/= 30seconds before onset of symptoms MET      Long Term Goals: To be accomplished in 5-10 treatments:  1) Perform Gaze Stabilization x1 while walking for >/= 60 seconds without symptoms  MET  2) Perform Gaze Stabilization x2 while walking for >/= 60 seconds without symptoms  MET  3) Perform convergence >/= 6 cm without symptoms MET  4) Perform VOR Cancellation while walking without reproduction of symptoms  MET  5) Balance on firm surface with eyes closed >/= 60 seconds without symptoms  MET                 6) Balance on soft surface with eyes closed >/= 60 seconds without symptoms MET   7) Tolerate visual conflict while walking without symptoms MET  8) Tolerate return to physical activity protocol without symptoms MET      PLAN  []  Upgrade activities as tolerated     []  Continue plan of care  []  Update interventions per flow sheet       [x]  Discharge due to:_ completion of goals  []  Other:_        Bambi Blanca, PT, DPT 12/8/2017  7:11 AM

## 2018-09-07 ENCOUNTER — OFFICE VISIT (OUTPATIENT)
Dept: FAMILY MEDICINE CLINIC | Age: 28
End: 2018-09-07

## 2018-09-07 VITALS
RESPIRATION RATE: 20 BRPM | TEMPERATURE: 98.6 F | OXYGEN SATURATION: 100 % | BODY MASS INDEX: 27.4 KG/M2 | HEART RATE: 84 BPM | HEIGHT: 66 IN | DIASTOLIC BLOOD PRESSURE: 77 MMHG | SYSTOLIC BLOOD PRESSURE: 127 MMHG | WEIGHT: 170.5 LBS

## 2018-09-07 DIAGNOSIS — J06.9 UPPER RESPIRATORY TRACT INFECTION, UNSPECIFIED TYPE: ICD-10-CM

## 2018-09-07 DIAGNOSIS — F90.9 ATTENTION DEFICIT HYPERACTIVITY DISORDER (ADHD), UNSPECIFIED ADHD TYPE: ICD-10-CM

## 2018-09-07 DIAGNOSIS — F41.9 ANXIETY: Primary | ICD-10-CM

## 2018-09-07 RX ORDER — SERTRALINE HYDROCHLORIDE 50 MG/1
50 TABLET, FILM COATED ORAL DAILY
Qty: 30 TAB | Refills: 2 | Status: SHIPPED | OUTPATIENT
Start: 2018-09-07 | End: 2018-10-25 | Stop reason: ALTCHOICE

## 2018-09-07 RX ORDER — HYDROXYZINE PAMOATE 25 MG/1
CAPSULE ORAL
Qty: 30 CAP | Refills: 0 | Status: SHIPPED | OUTPATIENT
Start: 2018-09-07 | End: 2018-10-04 | Stop reason: SDUPTHER

## 2018-09-07 NOTE — PROGRESS NOTES
Chief Complaint   Patient presents with    Cold Symptoms     rm [address]  Identified pt with two pt identifiers(name and ). Chief Complaint   Patient presents with    Cold Symptoms     rm 10    Anxiety       1. Have you been to the ER, urgent care clinic since your last visit?n  Hospitalized since your last visit?n    2. Have you seen or consulted any other health care providers outside of the [facility] since your last visit?n  Include any pap smears or colon screening.n      Advance Care Planning    In the event something were to happen to you and you were unable to speak on your behalf, do you have an Advance Directive/ Living Will in place stating your wishes? NO    If yes, do we have a copy on file/NO:    If no, would you like information YES    Medication reconciliation up to date and corrected with patient at this time. y    Today's provider has been notified of reason for visit, vitals and flowsheets obtained on patients. y    Reviewed record in preparation for visit, huddled with provider and have obtained necessary documentation.       Health Maintenance Due   Topic    HPV Age 9Y-34Y (1 of 3 - Female 3 Dose Series)    DTaP/Tdap/Td series (1 - Tdap)    Influenza Age 5 to Adult        Wt Readings from Last 3 Encounters:   18 170 lb 8 oz (77.3 kg)   10/25/17 165 lb (74.8 kg)   10/10/17 165 lb (74.8 kg)     Temp Readings from Last 3 Encounters:   18 98.6 °F (37 °C) (Oral)   10/25/17 98 °F (36.7 °C) (Oral)   10/10/17 98 °F (36.7 °C) (Oral)     BP Readings from Last 3 Encounters:   18 127/77   10/25/17 132/84   10/10/17 132/84     Pulse Readings from Last 3 Encounters:   18 84   10/25/17 88   10/10/17 68     Vitals:    18 0957   BP: 127/77   Pulse: 84   Resp: 20   Temp: 98.6 °F (37 °C)   TempSrc: Oral   SpO2: 100%   Weight: 170 lb 8 oz (77.3 kg)   Height: 5' 5.75\" (1.67 m)         Learning Assessment:  :     Learning Assessment 9/26/2017 10/11/2016   PRIMARY LEARNER Patient Patient   HIGHEST LEVEL OF EDUCATION - PRIMARY LEARNER  - GRADUATED HIGH SCHOOL OR GED   BARRIERS PRIMARY LEARNER - NONE   CO-LEARNER CAREGIVER - No   PRIMARY LANGUAGE ENGLISH ENGLISH   LEARNER PREFERENCE PRIMARY DEMONSTRATION DEMONSTRATION   ANSWERED BY Self pt   RELATIONSHIP SELF SELF       Depression Screening:  :     PHQ over the last two weeks 9/7/2018   Little interest or pleasure in doing things Not at all   Feeling down, depressed, irritable, or hopeless Not at all   Total Score PHQ 2 0       Fall Risk Assessment:  :     Fall Risk Assessment, last 12 mths 9/26/2017   Able to walk? Yes   Fall in past 12 months? No       Abuse Screening:  :     Abuse Screening Questionnaire 9/26/2017   Do you ever feel afraid of your partner? N   Are you in a relationship with someone who physically or mentally threatens you? N   Is it safe for you to go home? Y       ADL Screening:  :     ADL Assessment 9/7/2018   Feeding yourself No Help Needed   Getting from bed to chair No Help Needed   Getting dressed No Help Needed   Bathing or showering No Help Needed   Walk across the room (includes cane/walker) No Help Needed   Using the telphone No Help Needed   Taking your medications No Help Needed   Preparing meals No Help Needed   Managing money (expenses/bills) No Help Needed   Moderately strenuous housework (laundry) No Help Needed   Shopping for personal items (toiletries/medicines) No Help Needed   Shopping for groceries No Help Needed   Driving No Help Needed   Climbing a flight of stairs No Help Needed   Getting to places beyond walking distances No Help Needed                 Medication reconciliation up to date and corrected with patient at this time.

## 2018-09-07 NOTE — MR AVS SNAPSHOT
[address] [place] [address] 
[address] [place] [address] 
[phone] Patient: Kalin Zamora MRN: [id number] :1990 Visit Information Date & Time Provider Department Dept. Phone Encounter #  
 2018  9:40 AM Monika Estrada NP [facility] [phone] [id number] Follow-up Instructions Return in about 4 weeks (around 10/5/2018) for CPE, Depression/med check. Upcoming Health Maintenance Date Due  
 HPV Age 9Y-34Y (1 of 3 - Female 3 Dose Series) 10/15/2002 DTaP/Tdap/Td series (1 - Tdap) 10/15/2012 Influenza Age 5 to Adult 2018 PAP AKA CERVICAL CYTOLOGY 2020 Allergies as of 2018  Review Complete On: 2018 By: Ketty Jarrett LPN No Known Allergies Current Immunizations  Reviewed on 10/11/2016 Name Date Influenza Vaccine (Quad) PF 10/11/2016 Not reviewed this visit You Were Diagnosed With   
  
 Codes Comments Anxiety    -  Primary ICD-10-CM: F41.9 ICD-9-CM: 300.00 Upper respiratory tract infection, unspecified type     ICD-10-CM: J06.9 ICD-9-CM: 465.9 Attention deficit hyperactivity disorder (ADHD), unspecified ADHD type     ICD-10-CM: F90.9 ICD-9-CM: 314.01 Vitals BP Pulse Temp Resp Height(growth percentile) Weight(growth percentile) 127/77 (BP 1 Location: Left arm, BP Patient Position: Sitting) 84 98.6 °F (37 °C) (Oral) 20 5' 5.75\" (1.67 m) 170 lb 8 oz (77.3 kg) SpO2 BMI OB Status Smoking Status 100% 27.73 kg/m2 Having regular periods Never Smoker BMI and BSA Data Body Mass Index Body Surface Area  
 27.73 kg/m 2 1.89 m 2 Preferred Pharmacy Pharmacy Name Phone [facility] [address] [facility], [address] [phone] Your Updated Medication List  
  
   
This list is accurate as of 18 10:45 AM.  Always use your most recent med list.  
  
  
  
  
 hydrOXYzine pamoate 25 mg capsule Commonly known as:  VISTARIL Take 1 tab by mouth as needed for anxiety  Indications: anxiety  
  
 sertraline 50 mg tablet Commonly known as:  ZOLOFT Take 1 Tab by mouth daily. Indications: ANXIETY WITH DEPRESSION Prescriptions Sent to Pharmacy Refills  
 sertraline (ZOLOFT) 50 mg tablet 2 Sig: Take 1 Tab by mouth daily. Indications: ANXIETY WITH DEPRESSION Class: Normal  
 Pharmacy: [facility], [address]  Ph #: [phone] Route: Oral  
 hydrOXYzine pamoate (VISTARIL) 25 mg capsule 0 Sig: Take 1 tab by mouth as needed for anxiety  Indications: anxiety Class: Normal  
 Pharmacy: [email or web address], [address]  Ph #: [phone] We Performed the Following REFERRAL TO NEUROPSYCHOLOGY [FZL44 Custom] Follow-up Instructions Return in about 4 weeks (around 10/5/2018) for CPE, Depression/med check. Referral Information Referral ID Referred By Referred To  
  
 [id number] [address], [address] [facility] [address] [facility], [address] [facility] Phone: [phone] Fax: [phone] Visits Status Start Date End Date 1 New Request 9/7/18 9/7/19 If your referral has a status of pending review or denied, additional information will be sent to support the outcome of this decision. Patient Instructions 1) Anxiety Clinical depression is a medical condition that goes beyond everyday sadness. Depression may cause serious, long-lasting symptoms and often disrupts a persons ability to perform routine tasks. The disorder is the most common psychiatric disorder worldwide. In the [place], about one in six people experiences a bout of clinical depression in their lifetime.  
 
You have been prescribed  zoloft (sertraline) 50mg which is a selective serotonin reuptake inhibitor (SSRI) used to treat low mood (depression) and/or anxiety. Serotonin is a chemical messenger in the brain that affects mood, social behavior, appetite, sexual desire, memory and sleep. SSRIs block the reabsorption of serotonin in the brain, so there is more serotonin available. Please take this medication daily. Start with 1/2 tab for 4 days, then increase to full tab daily. Most people feel an effect within 1-2 weeks of starting the medication, but it can take up to 6-12 weeks to feel the full effect of the anti-depressant. Its important to keep in mind that each persons response to antidepressants is different, so give it a few weeks to start working. Side effects may include drowsiness, insomnia, weight change, dry mouth. If you are having uncomfortable side effects, tell your healthcare provider. Some side effects go away in 2-3 weeks. It might be possible to find a dose or alternate medication that causes fewer side effects if they are still bothering you after 6 weeks of trying the medication. Finding the right medication (or combination of medications) and the right doses can take some trial and error, so try not to get discouraged. Please find a therapist to help with ways to handle depression. Call your insurance company for referrals. Research shows that psychotherapy and antidepressants may be the best approach. Check with your insurance company to providers in the area. · Keep the numbers for these national suicide hotlines: 4-846-400-TALK (1-837.977.5121) and 9-382-TPUHPEX (8-491.312.1434). If you or someone you know talks about suicide or feeling hopeless, get help right away. 2) An upper respiratory infection (URI) is an infection of the throat and nose. It is also known as \"the common cold\". 90% of these infections are caused by a virus. Viral infections do not respond to antibiotic treatment, only bacteria are killed by antibiotics.  Therefore, supportive treatment is recommended to help with symptoms. Symptoms usually subside after 7-10 days (or longer if you smoke). If symptoms worsen or persist after 14 days, or if you have fever over 101.3, fever for 5 days or more, wheezing, or shortness of breath, please contact the office. To prevent URIs, wash hands frequently (epecially before and after eating - use hand  if you are in a public place.) Eat a healthy diet, get regular exercise and sufficient sleep. Reduce your exposure to cigarette smoke. If you need to cough or sneeze, use the crook of your arm to cover your mouth. Supportive Care 1) Alternate 400mg Ibuprofen and 650mg Tylenol every 4 hours as needed for sinus pain and discomfort. Make sure to take Ibuprofen with food as it can cause stomach upset. Do not exceed 3000 mg Tylenol per day. 2) Take a daily antihistamine to reduce symptoms such as sneezing, runny nose and itchy eyes. You can buy these over the counter (OTC) (no prescription needed) such as Claritin, Allegra or Zyrtec. Take this daily as it takes 3-4 weeks for the full therapeutic effect to take place. Follow directions on package. If symptoms of sneezing, coughing and runny nose are severe, you can try taking Benadryl at night before bed. However, Benadryl can cause drowsiness, so please use caution. Elderly people should not use Benadryl due to side effects and increase risk of falling. 3) OTC Robitussin or Delsym for cough relief. Follow directions on package. Do not exceed maximum dose. May cause drowsiness. If you have high blood pressure or kidney problems, avoid cough medicines that contain decongestants  such as pseudoephedrine, ephedrine, phenylephrine, naphazoline and oxymetazoline. 4) Use an OTC decongestant nasal spray such as Flonase, Nasonex, or Rhinocort. These contain a steroid and will help reduce congestion. You can spray 2x in each nostril two times a day for 3-5 days.   Use the opposite hand of the nostril you are spraying and look down at your toes when you administer the nasal spray. This ensures the spray is applied to the nasal tissue properly. If you use Afrin for nasal congestion, DO NOT use for more than 5 days (due to rebound congestion) Saline nasal spray can be used daily and will help restore moisture to dry nasal passages, wash away allergens and can help prevent inflammation of the mucous membranes. 5) Mucinex (guaifenesin) helps thin mucus and may make it easier to clear it from head, throat and lungs. 6) Increase your fluid consumption, especially water. Eat a well-balanced diet and avoid dairy and sugar as these can increase or thicken congestion. 7) Warm salt water gargle can help alleviate sore throat (1 teaspoon in 8 oz warm water) 8) Honey is a natural cough suppressor - can take a teaspoon of honey for cough or mix with hot tea. Local honey can help inoculate against local pollen that causes allergic reactions. (It has to be local honey from our area. You can usually find local honey at farmers' markets.) 9) Humidify air, especially in bedroom at night, as it may help with nasal and throat dryness. Breathing in steam from a shower may help loosen mucus and soothe an irritated throat. 10) Get plenty of rest! 11) A warm soak in a bath can help ease muscle and body aches. Add 2 cup of epsom salt to water (Dr. Ellene Severance and bubba is a good one- at [address] [facility] for around $6). Or you can put 1 cup of epsom salt in quart of warm water, soak a wash cloth in solution and apply to sore muscles. 12) Emergen C or [place] - vitamin supplements containing high doses of vitamin C, Vitamin B12 and B6 that can is marketed to help prevent or stop colds (although this has not been confirmed by scientific research) If symptoms persist for more than 10 days or if you have a fever above 102, please call the office.   Complications of URI include an infection of the skin around the eye and other infections. Watch for signs of fever, chills, body aches or any areas of red, warm, swollen and tender skin. Call immediately if you notice these signs. 3) Referral to neuropsych for ADD assessment 4) Cerumen (ear wax) is a natural lubricating and protective substance secreted in the ear canal. If you have a build up of ear wax, you can use an over the counter (OTC) product called Debrox to help remove the ear wax. Debrox comes in a yellow and green box and can be purchased at most drug stores. It works by releasing oxygen in the ear, allowing the solution to foam and soften and loosen the wax. Place 5-7 drops in the ear canal before going to bed at night for 2-4 nights. You can use a washcloth wrapped around your finger to gently removed ear wax as it exits the ear canal.  
 
Do NOT use Q-tips to clean your ears as this  pushes it further into the ear canal and packs the ear wax. Adjustment Disorder: Care Instructions Your Care Instructions Adjustment disorder means that you have emotional or behavioral problems because of stress. But your response to the stress is far more severe than a normal response. It is severe enough to affect your work or social life and may cause depression and physical pains and problems. Events that may cause this response can include a divorce, money problems, or starting school or a new job. It might be anything that causes some stress. This disorder is most often a short-term problem. It happens within 3 months of the stressful event or change. If the response lasts longer than 6 months after the event ends, you may have a more serious disorder. Follow-up care is a key part of your treatment and safety. Be sure to make and go to all appointments, and call your doctor if you are having problems. It's also a good idea to know your test results and keep a list of the medicines you take. How can you care for yourself at home?  
· Go to all counseling sessions. Do not skip any because you are feeling better. · If your doctor prescribed medicines, take them exactly as prescribed. Call your doctor if you think you are having a problem with your medicine. You will get more details on the specific medicines your doctor prescribes. · Discuss the causes of your stress with a good friend or family member. Or you can join a support group for people with similar problems. Talking to others sometimes relieves stress. · Get at least 30 minutes of exercise on most days of the week. Walking is a good choice. You also may want to do other activities, such as running, swimming, cycling, or playing tennis or team sports. Relaxation techniques Do relaxation exercises 10 to 20 minutes a day. You can play soothing, relaxing music while you do them, if you wish. · Tell others in your house that you are going to do your relaxation exercises. Ask them not to disturb you. · Find a comfortable, quiet place. · Lie down on your back, or sit with your back straight. · Focus on your breathing. Make it slow and steady. · Breathe in through your nose. Breathe out through either your nose or mouth. · Breathe deeply, filling up the area between your navel and your rib cage. Breathe so that your belly goes up and down. · Do not hold your breath. · Breathe like this for 5 to 10 minutes. Notice the feeling of calmness throughout your whole body. As you continue to breathe slowly and deeply, relax by doing these next steps for another 5 to 10 minutes: · Tighten and relax each muscle group in your body. Start at your toes, and work your way up to your head. · Imagine your muscle groups relaxing and getting heavy. · Empty your mind of all thoughts. · Let yourself relax more and more deeply. · Be aware of the state of calmness that surrounds you. · When your relaxation time is over, you can bring yourself back to alertness by moving your fingers and toes.  Then move your hands and feet. And then move your entire body. Sometimes people fall asleep during relaxation. But they most often wake up soon. · Always give yourself time to return to full alertness before you drive a car. Wait to do anything that might cause an accident if you are not fully alert. Never play a relaxation tape while you drive a car. When should you call for help? Call 911 anytime you think you may need emergency care. For example, call if: 
  · You feel you cannot stop from hurting yourself or someone else. Keep the numbers for these national suicide hotlines: 7-780-395-TALK (5-391.207.2838) and 6-416-LCVIPWP (0-834.520.3034). If you or someone you know talks about suicide or feeling hopeless, get help right away.  
 Watch closely for changes in your health, and be sure to contact your doctor if: 
  · You have new anxiety, or your anxiety gets worse.  
  · You have been feeling sad, depressed, or hopeless or have lost interest in things that you usually enjoy.  
  · You do not get better as expected. Where can you learn more? Go to http://fengWebStudiyo Productionspadmini.info/. Enter [id number] [id number] in the search box to learn more about \"Adjustment Disorder: Care Instructions. \" Current as of: October 10, 2017 Content Version: 11.7 © 8439-9453 [employer]. Care instructions adapted under license by [employer] (which disclaims liability or warranty for this information). If you have questions about a medical condition or this instruction, always ask your healthcare professional. [place] [address] any warranty or liability for your use of this information. Introducing [facility]! Patricia Coates introduces Shout patient portal. Now you can access parts of your medical record, email your doctor's office, and request medication refills online. 1. In your internet browser, go to [email or web address] 2. Click on the First Time User? Click Here link in the Sign In box. You will see the New Member Sign Up page. 3. Enter your Advisor Client Match Access Code exactly as it appears below. You will not need to use this code after youve completed the sign-up process. If you do not sign up before the expiration date, you must request a new code. · [employer] Match Access Code: [email or web address] Expires: 12/6/2018 10:31 AM 
 
4. Enter the last four digits of your Social Security Number (xxxx) and Date of Birth (mm/dd/yyyy) as indicated and click Submit. You will be taken to the next sign-up page. 5. Create a Advisor Client Match ID. This will be your Advisor Client Match login ID and cannot be changed, so think of one that is secure and easy to remember. 6. Create a [employer] Match password. You can change your password at any time. 7. Enter your Password Reset Question and Answer. This can be used at a later time if you forget your password. 8. Enter your e-mail address. You will receive e-mail notification when new information is available in [address] 9. Click Sign Up. You can now view and download portions of your medical record. 10. Click the Download Summary menu link to download a portable copy of your medical information. If you have questions, please visit the Frequently Asked Questions section of the [employer]visor Client Match website. Remember, Advisor Client Match is NOT to be used for urgent needs. For medical emergencies, dial 911. Now available from your iPhone and Android! Please provide this summary of care documentation to your next provider. Your primary care clinician is listed as Phys Other. If you have any questions after today's visit, please call [phone].

## 2018-09-07 NOTE — PROGRESS NOTES
S: Jah Villanueva is a 33 y.o. female who presents with runny nose, cough    Assessment/Plan:  1. Anxiety  -PHQ9 score = 6, no SI/HI  -didn't think lexapro 10mg worked, so tapered off it a few months ago, thinks she needs something as anxiety/dep seems to be increasing  -trial:  sertraline (ZOLOFT) 50 mg daily + hydroxyzine 25mg prn for anxiety    2. Upper respiratory tract infection, unspecified type  -instructions for supportive care given  -right ear pain - can't visualize TM d/t cerumen impaction and lavage didn't clear it; advised pt to use debrox and rtc for lavage    3. Attention deficit hyperactivity disorder (ADHD), unspecified ADHD type  - REFERRAL TO NEUROPSYCHOLOGY for evaluation       HPI:  Sx started:3 days ago  +cough   Not productive cough  +SOB, wheezing  fever/chills  +sore throat  +nasal discharge  No watery eyes    +HA  +sinus pressure  +ear pain/pressure    PHQ-9 Score: 6  Over the past 2 weeks, how often have you been bothered by any of the following problems? (Not at all = 0; several days = 1; More than 1/2 the days = 2; nearly every day = 3)  1) Little interest or pleasure in doing things:  2  2) Feeling down, depressed or hopeless: 1  3) Trouble falling asleep, staying asleep or sleeping too much: 0  4) Feeling tired or having little energy: 2 - could be job as it is fairly new   5) Poor appetite or overeatin  6) Feeling bad about yourself - or that you're a failure or have let yourself or your family down: 1  7) Trouble concentrating on things, such as reading the newspaper or watching TV: 2 - always been an issue (on ADD meds for school as kid)  8) Moving or speaking so slowly that other people could have noticed.  Or, the opposite - being so fidgety or restless that you have been moving around a lot more than usual: 0   9) Thoughts that you would be better off dead or of hurting yourself in some way: 0    Did try paxil in the past but gained weight and felt sick - had also been on xanax but not interested in restarting this. Was in [place] with family this weekend and had anxiety/panic attack on drive home - family had to pull over so she could calm down  Anxiety has been causing problems with boyfriend   Has started to go back to gym   Mom suffers from dep/anxiety     Social history:   Nutrition: overall healthy, drinking water daily    Occupation: [employer]   Alcohol - discussed depressant effect     History   Smoking Status    Never Smoker   Smokeless Tobacco    Never Used     History   Alcohol Use    Yes     Comment: 1 x month, several beers      History   Drug Use No       Review of Systems:  - Constitutional symptoms: + fatigue  - Cardiovascular: no chest pain or palpitations  - Gastrointestinal: no nausea or vomiting or ab pain  ROS is negative otherwise. I reviewed the following:  Past Medical History:   Diagnosis Date    H/O craniotomy         No Known Allergies    O: VS:   Visit Vitals    /77 (BP 1 Location: Left arm, BP Patient Position: Sitting)    Pulse 84    Temp 98.6 °F (37 °C) (Oral)    Resp 20    Ht 5' 5.75\" (1.67 m)    Wt 170 lb 8 oz (77.3 kg)    SpO2 100%    BMI 27.73 kg/m2       GENERAL: Maria Antonia Sanchez is in no acute distress. Non-toxic. HEAD:  Normocephalic. Atraumatic. Non tender sinuses x 4. EYE: PERRLA. EOMs intact. Sclera anicteric without injection. No drainage or discharge. EARS: Hearing intact bilaterally. External ear canals normal without evidence of blood or swelling. Left TM's intact, pearly grey with landmarks visible. No erythema or effusion. Right TM not visualized d/t cerumen impaction. Not able to clear with lavage. NOSE: Patent. Nasal turbinates pink. No erythema. No discharge. MOUTH: mucous membranes pink and moist. Posterior pharynx normal with cobblestone appearance. Erythema, no white exudate or obstruction. NECK: supple. Midline trachea. No anterior cervical lymphadenopathy noted.   RESP: Breath sounds are symmetrical bilaterally. Unlabored without SOB. Speaking in full sentences. Clear to auscultation bilaterally anteriorly and posteriorly. No wheezes. No rales or rhonchi. CV: normal rate. Regular rhythm. S1, S2 audible. No murmur noted. No rubs, clicks or gallops noted. ABDOMEN: Soft and nondistended. No tenderness on palpation. SKIN: Skin is warm and dry. Turgor is normal.   ______________________________________________________________________  I spent >25 minutes face to face with patient with >50% of time spent in counseling and coordinating care. Patient education was done. Advised on nutrition, tobacco, and good handwashing. Counseling included discussion of diagnosis, differentials, treatment options, prescribed treatment, warning signs and follow up. Medication risks/benefits, costs, interactions and alternatives discussed with patient.      Patient verbalized understanding and agreed to plan of care. If you are not feeling better or you begin to feel worse or develop new symptoms in 3-4 days please call.

## 2018-09-07 NOTE — PATIENT INSTRUCTIONS
1) Anxiety    Clinical depression is a medical condition that goes beyond everyday sadness. Depression may cause serious, long-lasting symptoms and often disrupts a persons ability to perform routine tasks. The disorder is the most common psychiatric disorder worldwide. In the [place], about one in six people experiences a bout of clinical depression in their lifetime. You have been prescribed  zoloft (sertraline) 50mg which is a selective serotonin reuptake inhibitor (SSRI) used to treat low mood (depression) and/or anxiety. Serotonin is a chemical messenger in the brain that affects mood, social behavior, appetite, sexual desire, memory and sleep. SSRIs block the reabsorption of serotonin in the brain, so there is more serotonin available. Please take this medication daily. Start with 1/2 tab for 4 days, then increase to full tab daily. Most people feel an effect within 1-2 weeks of starting the medication, but it can take up to 6-12 weeks to feel the full effect of the anti-depressant. Its important to keep in mind that each persons response to antidepressants is different, so give it a few weeks to start working. Side effects may include drowsiness, insomnia, weight change, dry mouth. If you are having uncomfortable side effects, tell your healthcare provider. Some side effects go away in 2-3 weeks. It might be possible to find a dose or alternate medication that causes fewer side effects if they are still bothering you after 6 weeks of trying the medication. Finding the right medication (or combination of medications) and the right doses can take some trial and error, so try not to get discouraged. Please find a therapist to help with ways to handle depression. Call your insurance company for referrals. Research shows that psychotherapy and antidepressants may be the best approach. Check with your insurance company to providers in the area.      · Keep the numbers for these national suicide hotlines: 3-269-313-TALK (7-118.864.8164) and 6-077-QKMLLHQ (5-735.977.1688). If you or someone you know talks about suicide or feeling hopeless, get help right away. 2) An upper respiratory infection (URI) is an infection of the throat and nose. It is also known as \"the common cold\". 90% of these infections are caused by a virus. Viral infections do not respond to antibiotic treatment, only bacteria are killed by antibiotics. Therefore, supportive treatment is recommended to help with symptoms. Symptoms usually subside after 7-10 days (or longer if you smoke). If symptoms worsen or persist after 14 days, or if you have fever over 101.3, fever for 5 days or more, wheezing, or shortness of breath, please contact the office. To prevent URIs, wash hands frequently (epecially before and after eating - use hand  if you are in a public place.) Eat a healthy diet, get regular exercise and sufficient sleep. Reduce your exposure to cigarette smoke. If you need to cough or sneeze, use the crook of your arm to cover your mouth. Supportive Care    1) Alternate 400mg Ibuprofen and 650mg Tylenol every 4 hours as needed for sinus pain and discomfort. Make sure to take Ibuprofen with food as it can cause stomach upset. Do not exceed 3000 mg Tylenol per day. 2) Take a daily antihistamine to reduce symptoms such as sneezing, runny nose and itchy eyes. You can buy these over the counter (OTC) (no prescription needed) such as Claritin, Allegra or Zyrtec. Take this daily as it takes 3-4 weeks for the full therapeutic effect to take place. Follow directions on package. If symptoms of sneezing, coughing and runny nose are severe, you can try taking Benadryl at night before bed. However, Benadryl can cause drowsiness, so please use caution. Elderly people should not use Benadryl due to side effects and increase risk of falling. 3) OTC Robitussin or Delsym for cough relief.  Follow directions on package. Do not exceed maximum dose. May cause drowsiness. If you have high blood pressure or kidney problems, avoid cough medicines that contain decongestants -- such as pseudoephedrine, ephedrine, phenylephrine, naphazoline and oxymetazoline. 4) Use an OTC decongestant nasal spray such as Flonase, Nasonex, or Rhinocort. These contain a steroid and will help reduce congestion. You can spray 2x in each nostril two times a day for 3-5 days. Use the opposite hand of the nostril you are spraying and look down at your toes when you administer the nasal spray. This ensures the spray is applied to the nasal tissue properly. If you use Afrin for nasal congestion, DO NOT use for more than 5 days (due to rebound congestion)     Saline nasal spray can be used daily and will help restore moisture to dry nasal passages, wash away allergens and can help prevent inflammation of the mucous membranes. 5) Mucinex (guaifenesin) helps thin mucus and may make it easier to clear it from head, throat and lungs. 6) Increase your fluid consumption, especially water. Eat a well-balanced diet and avoid dairy and sugar as these can increase or thicken congestion. 7) Warm salt water gargle can help alleviate sore throat (1 teaspoon in 8 oz warm water)    8) Honey is a natural cough suppressor - can take a teaspoon of honey for cough or mix with hot tea. Local honey can help inoculate against local pollen that causes allergic reactions. (It has to be local honey from our area. You can usually find local honey at farmers' markets.)     9) Humidify air, especially in bedroom at night, as it may help with nasal and throat dryness. Breathing in steam from a shower may help loosen mucus and soothe an irritated throat. 10) Get plenty of rest!    11) A warm soak in a bath can help ease muscle and body aches.  Add 2 cup of epsom salt to water (Dr. Delroy Quick is a good one- at [facility] for around $6). Or you can put 1 cup of epsom salt in quart of warm water, soak a wash cloth in solution and apply to sore muscles. 12) Emergen C or [place] - vitamin supplements containing high doses of vitamin C, Vitamin B12 and B6 that can is marketed to help prevent or stop colds (although this has not been confirmed by scientific research)     If symptoms persist for more than 10 days or if you have a fever above 102, please call the office. Complications of URI include an infection of the skin around the eye and other infections. Watch for signs of fever, chills, body aches or any areas of red, warm, swollen and tender skin. Call immediately if you notice these signs. 3) Referral to neuropsych for ADD assessment    4) Cerumen (ear wax) is a natural lubricating and protective substance secreted in the ear canal. If you have a build up of ear wax, you can use an over the counter (OTC) product called Debrox to help remove the ear wax. Debrox comes in a yellow and green box and can be purchased at most drug stores. It works by releasing oxygen in the ear, allowing the solution to foam and soften and loosen the wax. Place 5-7 drops in the ear canal before going to bed at night for 2-4 nights. You can use a washcloth wrapped around your finger to gently removed ear wax as it exits the ear canal.     Do NOT use Q-tips to clean your ears as this  pushes it further into the ear canal and packs the ear wax. Adjustment Disorder: Care Instructions  Your Care Instructions    Adjustment disorder means that you have emotional or behavioral problems because of stress. But your response to the stress is far more severe than a normal response. It is severe enough to affect your work or social life and may cause depression and physical pains and problems. Events that may cause this response can include a divorce, money problems, or starting school or a new job.  It might be anything that causes some stress. This disorder is most often a short-term problem. It happens within 3 months of the stressful event or change. If the response lasts longer than 6 months after the event ends, you may have a more serious disorder. Follow-up care is a key part of your treatment and safety. Be sure to make and go to all appointments, and call your doctor if you are having problems. It's also a good idea to know your test results and keep a list of the medicines you take. How can you care for yourself at home? · Go to all counseling sessions. Do not skip any because you are feeling better. · If your doctor prescribed medicines, take them exactly as prescribed. Call your doctor if you think you are having a problem with your medicine. You will get more details on the specific medicines your doctor prescribes. · Discuss the causes of your stress with a good friend or family member. Or you can join a support group for people with similar problems. Talking to others sometimes relieves stress. · Get at least 30 minutes of exercise on most days of the week. Walking is a good choice. You also may want to do other activities, such as running, swimming, cycling, or playing tennis or team sports. Relaxation techniques  Do relaxation exercises 10 to 20 minutes a day. You can play soothing, relaxing music while you do them, if you wish. · Tell others in your house that you are going to do your relaxation exercises. Ask them not to disturb you. · Find a comfortable, quiet place. · Lie down on your back, or sit with your back straight. · Focus on your breathing. Make it slow and steady. · Breathe in through your nose. Breathe out through either your nose or mouth. · Breathe deeply, filling up the area between your navel and your rib cage. Breathe so that your belly goes up and down. · Do not hold your breath. · Breathe like this for 5 to 10 minutes. Notice the feeling of calmness throughout your whole body.   As you continue to breathe slowly and deeply, relax by doing these next steps for another 5 to 10 minutes:  · Tighten and relax each muscle group in your body. Start at your toes, and work your way up to your head. · Imagine your muscle groups relaxing and getting heavy. · Empty your mind of all thoughts. · Let yourself relax more and more deeply. · Be aware of the state of calmness that surrounds you. · When your relaxation time is over, you can bring yourself back to alertness by moving your fingers and toes. Then move your hands and feet. And then move your entire body. Sometimes people fall asleep during relaxation. But they most often wake up soon. · Always give yourself time to return to full alertness before you drive a car. Wait to do anything that might cause an accident if you are not fully alert. Never play a relaxation tape while you drive a car. When should you call for help? Call 911 anytime you think you may need emergency care. For example, call if:    · You feel you cannot stop from hurting yourself or someone else. Keep the numbers for these national suicide hotlines: 5-695-918-TALK (0-294-983-257.249.5665) and 9-306-JTCZGWT (5-585-099-895.358.1864). If you or someone you know talks about suicide or feeling hopeless, get help right away.    Watch closely for changes in your health, and be sure to contact your doctor if:    · You have new anxiety, or your anxiety gets worse.     · You have been feeling sad, depressed, or hopeless or have lost interest in things that you usually enjoy.     · You do not get better as expected. Where can you learn more? Go to http://feng-padmini.info/. Enter 0688 [id number] in the search box to learn more about \"Adjustment Disorder: Care Instructions. \"  Current as of: October 10, 2017  Content Version: 11.7  © 8748-7514 [employer]. Care instructions adapted under license by [employer] (which disclaims liability or warranty for this information).  If you have questions about a medical condition or this instruction, always ask your healthcare professional. [place] [address] any warranty or liability for your use of this information.

## 2018-09-07 NOTE — LETTER
NOTIFICATION RETURN TO WORK / SCHOOL 
 
9/7/2018 10:49 AM 
 
Ms. Nell Copealnd 
[address] [facility] [address] To Whom It May Concern: 
 
Lluvia Zaragoza is currently under the care of Osmin Mckeon. She will return to work/school on: 09/10/2018 If there are questions or concerns please have the patient contact our office. Sincerely, Rocio Garrett NP

## 2018-10-04 DIAGNOSIS — F41.9 ANXIETY: ICD-10-CM

## 2018-10-04 RX ORDER — HYDROXYZINE PAMOATE 25 MG/1
CAPSULE ORAL
Qty: 30 CAP | Refills: 0 | Status: SHIPPED | OUTPATIENT
Start: 2018-10-04 | End: 2018-10-25 | Stop reason: ALTCHOICE

## 2018-10-25 ENCOUNTER — OFFICE VISIT (OUTPATIENT)
Dept: FAMILY MEDICINE CLINIC | Age: 28
End: 2018-10-25

## 2018-10-25 VITALS
TEMPERATURE: 98.3 F | HEIGHT: 65 IN | OXYGEN SATURATION: 98 % | SYSTOLIC BLOOD PRESSURE: 122 MMHG | RESPIRATION RATE: 16 BRPM | WEIGHT: 169 LBS | HEART RATE: 76 BPM | BODY MASS INDEX: 28.16 KG/M2 | DIASTOLIC BLOOD PRESSURE: 92 MMHG

## 2018-10-25 DIAGNOSIS — R52 BODY ACHES: ICD-10-CM

## 2018-10-25 DIAGNOSIS — B34.9 ACUTE VIRAL SYNDROME: Primary | ICD-10-CM

## 2018-10-25 DIAGNOSIS — J02.9 SORE THROAT: ICD-10-CM

## 2018-10-25 LAB
QUICKVUE INFLUENZA TEST: NEGATIVE
S PYO AG THROAT QL: NEGATIVE
VALID INTERNAL CONTROL?: YES
VALID INTERNAL CONTROL?: YES

## 2018-10-25 RX ORDER — CYCLOBENZAPRINE HCL 5 MG
TABLET ORAL
COMMUNITY
End: 2019-01-23

## 2018-10-25 RX ORDER — HYDROXYZINE PAMOATE 25 MG/1
CAPSULE ORAL
COMMUNITY
Start: 2018-09-07 | End: 2019-01-23

## 2018-10-25 RX ORDER — SERTRALINE HYDROCHLORIDE 50 MG/1
TABLET, FILM COATED ORAL
COMMUNITY
Start: 2018-09-07 | End: 2019-01-23

## 2018-10-25 RX ORDER — CEPHALEXIN 500 MG/1
CAPSULE ORAL
COMMUNITY
End: 2019-01-23

## 2018-10-25 NOTE — LETTER
NOTIFICATION RETURN TO WORK / SCHOOL 
 
10/25/2018 1:02 PM 
 
Ms. Nell Copeland 
[address] [id number] To Whom It May Concern: 
 
Kathy Mendez is currently under the care of Osmin Mckeon. She will return to work once she has no fever for 24 hours. If there are questions or concerns please have the patient contact our office. Sincerely, Ida Rocha MD

## 2018-10-25 NOTE — PROGRESS NOTES
Lynette Dougie  Identified pt with two pt identifiers(name and ). Chief Complaint   Patient presents with    Generalized Body Aches    Ear Pain     both ears    Dizziness     denies SOB and chest pain    Cough    Sore Throat    Concussion     f/u    Light Sensitivity     Patient does not know if she is having concussion symptoms or if she is sick. Has been sensitive to light. Carolalfredojun Brown for concussion. Stated that she has been dizzy off and on since have her ear flushed at last visit; stated that [facility] could not see if she had an ear or not due to wax build up. 1. Have you been to the ER, urgent care clinic since your last visit? Hospitalized since your last visit? No    2. Have you seen or consulted any other health care providers outside of the [address] [place] [address] since your last visit? Include any pap smears or colon screening. No    In the event something were to happen to you and you were unable to speak on your behalf, do you have an Advance Directive/ Living Will in place stating your wishes? NO    If yes, do we have a copy on file NO    If no, would you like information:            Would you like to sign up for [employer] today, if you have not already done so? Patient declined. If not, would you like information on [employer], and how to sign up at a later time? No      Medication reconciliation up to date and corrected with patient at this time. Today's provider has been notified of reason for visit, vitals and flowsheets obtained on patients. Reviewed record in preparation for visit, huddled with provider and have obtained necessary documentation.       Health Maintenance Due   Topic    DTaP/Tdap/Td series (1 - Tdap)       Wt Readings from Last 3 Encounters:   10/25/18 169 lb (76.7 kg)   18 170 lb 8 oz (77.3 kg)   10/25/17 165 lb (74.8 kg)     Temp Readings from Last 3 Encounters:   18 98.6 °F (37 °C) (Oral)   10/25/17 98 °F (36.7 °C) (Oral)   10/10/17 98 °F (36.7 °C) (Oral)     BP Readings from Last 3 Encounters:   09/07/18 127/77   10/25/17 132/84   10/10/17 132/84     Pulse Readings from Last 3 Encounters:   09/07/18 84   10/25/17 88   10/10/17 68     Vitals:    10/25/18 1129   BP: (!) 122/92   Pulse: 76   Resp: 16   Temp: 98.3 °F (36.8 °C)   TempSrc: Oral   SpO2: 98%   Weight: 169 lb (76.7 kg)   Height: 5' 5.16\" (1.655 m)   PainSc:  10 - Worst pain ever   PainLoc: Ear         Learning Assessment:  :     Learning Assessment 9/26/2017 10/11/2016   PRIMARY LEARNER Patient Patient   HIGHEST LEVEL OF EDUCATION - PRIMARY LEARNER  - GRADUATED HIGH SCHOOL OR GED   BARRIERS PRIMARY LEARNER - NONE   CO-LEARNER CAREGIVER - No   PRIMARY LANGUAGE ENGLISH ENGLISH   LEARNER PREFERENCE PRIMARY DEMONSTRATION DEMONSTRATION   ANSWERED BY Self pt   RELATIONSHIP SELF SELF       Depression Screening:  :     PHQ over the last two weeks 9/7/2018   Little interest or pleasure in doing things Not at all   Feeling down, depressed, irritable, or hopeless Not at all   Total Score PHQ 2 0       Fall Risk Assessment:  :     Fall Risk Assessment, last 12 mths 9/26/2017   Able to walk? Yes   Fall in past 12 months? No       Abuse Screening:  :     Abuse Screening Questionnaire 9/26/2017   Do you ever feel afraid of your partner? N   Are you in a relationship with someone who physically or mentally threatens you? N   Is it safe for you to go home?  Y       ADL Screening:  :     ADL Assessment 9/7/2018   Feeding yourself No Help Needed   Getting from bed to chair No Help Needed   Getting dressed No Help Needed   Bathing or showering No Help Needed   Walk across the room (includes cane/walker) No Help Needed   Using the telphone No Help Needed   Taking your medications No Help Needed   Preparing meals No Help Needed   Managing money (expenses/bills) No Help Needed   Moderately strenuous housework (laundry) No Help Needed   Shopping for personal items (toiletries/medicines) No Help Needed   Shopping for groceries No Help Needed   Driving No Help Needed   Climbing a flight of stairs No Help Needed   Getting to places beyond walking distances No Help Needed

## 2018-10-26 ENCOUNTER — TELEPHONE (OUTPATIENT)
Dept: FAMILY MEDICINE CLINIC | Age: 28
End: 2018-10-26

## 2018-10-26 NOTE — TELEPHONE ENCOUNTER
Writer returned call to patient, two patient identifiers used for verification of patient. Patient stated that he head feels full and her ear is achey, per NP Melyssa Soto, patient can try flonase to see if that gives relief, patient voiced understanding, Writer asked patient had she been to ENT since it was in her discharge summary that she had an appointment to see ENT on 10/25/2018, Patient stated that she never told MD Read that she was going to ENT, writer advised patient that if pain gets worse, it is recommended that she go to [facility] since office is closed over the weekend. Patient got loud on phone with writer and stated \" I should not have to pay to go to another doctor when I just paid to go there and was not looked at, and patient said [facility] a good day\" and hung up.

## 2018-10-26 NOTE — TELEPHONE ENCOUNTER
----- Message from Annie Hutson sent at 10/25/2018  5:25 PM EDT -----  Regarding: MIMI Peck/telephone  Pt (p) [phone],QB said she is very upset with the treatment that she received today, the appt did not go as planned and said Dr Giles Martin did not treat her professionally  After explaining  her symptoms  He r/o it was not a concussion but the way he went about was horrible  and she left the practice very upset

## 2018-10-26 NOTE — TELEPHONE ENCOUNTER
Writer call patient in regards to message received about her treatment here in the office. Patient explained how she had to wait for a long time to be seen. Her appt. Was on 11/25/18 at 11:20am and she informed the writer that she was not seen until around 1pm. Writer apologized to the patient. Writer informed the patient that this was unusal for the provider that she was seeing, a follow up would be done to see what went wrong. Patient verbalized understanding. Patient also stated that it took a while for the nurse to get the results for the flu test, because there were issues with the machine. Writer verbalized understanding and apologized to the patient. Patient stated that she felt like the provider wasn't listening to what symptoms she was telling the provider  she was having. She felt like the provider just want to get the visit over with. She stated, \"the provider was just very witty and sarcastic when talking to me. I felt very uncomfortable. I just want to feel better. \" Daija Holman to patient. Patient stated that she has suffered from a concussion before so she doesn't know when her body is experiencing other symptoms that are not related to the concussion and she wants to make sure she doesn't need to see a neurologist. She said the provider and her went back and forth on seeing the neurologist. Could not come to some sort of agreement. Patient is also stated that she is having trouble with her ears and is not sure if she needs to see an ENT. She is experiencing sharp pains and dizziness. Informed writer that the provider did not look at her ear at her visit yesterday. She wasn't given an antibiotic, because she was told she had a bug. Patient is requesting a call back from Nurse Practitioner Clay Samaniego, or [facility] [place] by [facility] nurse on what she needs to do.

## 2019-01-23 ENCOUNTER — OFFICE VISIT (OUTPATIENT)
Dept: URGENT CARE | Age: 29
End: 2019-01-23

## 2019-01-23 VITALS
HEIGHT: 65 IN | TEMPERATURE: 98.6 F | SYSTOLIC BLOOD PRESSURE: 122 MMHG | DIASTOLIC BLOOD PRESSURE: 77 MMHG | HEART RATE: 97 BPM | OXYGEN SATURATION: 100 % | WEIGHT: 170 LBS | BODY MASS INDEX: 28.32 KG/M2 | RESPIRATION RATE: 18 BRPM

## 2019-01-23 DIAGNOSIS — H57.11 PERIORBITAL PAIN, RIGHT: Primary | ICD-10-CM

## 2019-01-23 DIAGNOSIS — H05.221: ICD-10-CM

## 2019-01-23 RX ORDER — CEPHALEXIN 500 MG/1
500 CAPSULE ORAL 4 TIMES DAILY
Qty: 28 CAP | Refills: 0 | Status: SHIPPED | OUTPATIENT
Start: 2019-01-23 | End: 2019-01-30

## 2019-01-23 RX ORDER — POLYMYXIN B SULFATE AND TRIMETHOPRIM 1; 10000 MG/ML; [USP'U]/ML
1 SOLUTION OPHTHALMIC EVERY 4 HOURS
Qty: 5 ML | Refills: 0 | Status: SHIPPED | OUTPATIENT
Start: 2019-01-23 | End: 2019-02-01 | Stop reason: ALTCHOICE

## 2019-01-23 NOTE — LETTER
[address] [address] [place] [address] 
[phone] Work/School Note Date: 1/23/2019 To Whom It May concern: 
 
Lashae Ramírez was seen and treated today in the urgent care center by the following provider(s): 
No providers found. Lashae Ramírez may return to work on 1/24/18. Sincerely, Yaw Almonte MD

## 2019-01-23 NOTE — PATIENT INSTRUCTIONS
Pinkeye: Care Instructions  Your Care Instructions    Pinkeye is redness and swelling of the eye surface and the conjunctiva (the lining of the eyelid and the covering of the white part of the eye). Pinkeye is also called conjunctivitis. Pinkeye is often caused by infection with bacteria or a virus. Dry air, allergies, smoke, and chemicals are other common causes. Pinkeye often clears on its own in 7 to 10 days. Antibiotics only help if the pinkeye is caused by bacteria. Pinkeye caused by infection spreads easily. If an allergy or chemical is causing pinkeye, it will not go away unless you can avoid whatever is causing it. Follow-up care is a key part of your treatment and safety. Be sure to make and go to all appointments, and call your doctor if you are having problems. It's also a good idea to know your test results and keep a list of the medicines you take. How can you care for yourself at home? · Wash your hands often. Always wash them before and after you treat pinkeye or touch your eyes or face. · Use moist cotton or a clean, wet cloth to remove crust. Wipe from the inside corner of the eye to the outside. Use a clean part of the cloth for each wipe. · Put cold or warm wet cloths on your eye a few times a day if the eye hurts. · Do not wear contact lenses or eye makeup until the pinkeye is gone. Throw away any eye makeup you were using when you got pinkeye. Clean your contacts and storage case. If you wear disposable contacts, use a new pair when your eye has cleared and it is safe to wear contacts again. · If the doctor gave you antibiotic ointment or eyedrops, use them as directed. Use the medicine for as long as instructed, even if your eye starts looking better soon. Keep the bottle tip clean, and do not let it touch the eye area. · To put in eyedrops or ointment:  ? Tilt your head back, and pull your lower eyelid down with one finger. ?  Drop or squirt the medicine inside the lower lid.  ? Close your eye for 30 to 60 seconds to let the drops or ointment move around. ? Do not touch the ointment or dropper tip to your eyelashes or any other surface. · Do not share towels, pillows, or washcloths while you have pinkeye. When should you call for help? Call your doctor now or seek immediate medical care if:    · You have pain in your eye, not just irritation on the surface.     · You have a change in vision or loss of vision.     · You have an increase in discharge from the eye.     · Your eye has not started to improve or begins to get worse within 48 hours after you start using antibiotics.     · Pinkeye lasts longer than 7 days.    Watch closely for changes in your health, and be sure to contact your doctor if you have any problems. Where can you learn more? Go to http://feng-padmini.info/. Enter Y392 in the search box to learn more about \"Pinkeye: Care Instructions. \"  Current as of: September 23, 2018  Content Version: 11.9  © 1034-2300 [employer]. Care instructions adapted under license by [employer] (which disclaims liability or warranty for this information). If you have questions about a medical condition or this instruction, always ask your healthcare professional. [address] any warranty or liability for your use of this information.

## 2019-01-23 NOTE — PROGRESS NOTES
Eye Pain   This is a new (Rt eye) problem. The current episode started 2 days ago. The problem occurs constantly. The problem has not changed since onset. Pertinent negatives include no headaches. Associated symptoms comments: See ROS. Nothing aggravates the symptoms. Nothing relieves the symptoms. She has tried nothing for the symptoms. Past Medical History:   Diagnosis Date    H/O craniotomy         Past Surgical History:   Procedure Laterality Date    HX ACL RECONSTRUCTION      HX OTHER SURGICAL      Brain Surgery/Craniotomy          Family History   Problem Relation Age of Onset    Cancer Father     Diabetes Maternal Grandmother         Social History     Socioeconomic History    Marital status: SINGLE     Spouse name: Not on file    Number of children: Not on file    Years of education: Not on file    Highest education level: Not on file   Social Needs    Financial resource strain: Not on file    Food insecurity - worry: Not on file    Food insecurity - inability: Not on file   [employer] needs - medical: Not on file   [employer] needs - non-medical: Not on file   Occupational History    Not on file   Tobacco Use    Smoking status: Never Smoker    Smokeless tobacco: Never Used   Substance and Sexual Activity    Alcohol use: Yes     Comment: 1 x month, several beers     Drug use: No    Sexual activity: Not on file   Other Topics Concern    Not on file   Social History Narrative    Not on file                ALLERGIES: Patient has no known allergies. Review of Systems   Eyes: Positive for pain (periorbital - Rt eye), discharge and redness. Neurological: Negative for headaches. All other systems reviewed and are negative. Vitals:    01/23/19 0830   BP: 122/77   Pulse: 97   Resp: 18   Temp: 98.6 °F (37 °C)   SpO2: 100%   Weight: 170 lb (77.1 kg)   Height: 5' 5\" (1.651 m)       Physical Exam   Constitutional: No distress.    HENT:   Right Ear: Tympanic membrane and ear canal normal.   Left Ear: Tympanic membrane and ear canal normal.   Nose: Nose normal.   Mouth/Throat: No oropharyngeal exudate, posterior oropharyngeal edema or posterior oropharyngeal erythema. Eyes: EOM are normal. Pupils are equal, round, and reactive to light. Right eye exhibits no discharge, no exudate and no hordeolum. Left eye exhibits no discharge. Right conjunctiva is injected (mild). Rt eye- lateral periorbital soreness- no erythema/ swelling    Neck: Neck supple. Pulmonary/Chest: Effort normal and breath sounds normal. No respiratory distress. She has no wheezes. She has no rales. Lymphadenopathy:     She has no cervical adenopathy. Skin: No rash noted. Nursing note and vitals reviewed. MDM    Procedures      ICD-10-CM ICD-9-CM    1. Periorbital pain, right H57.11 379.91     ? infection    2. Orbital congestion of right eye H05.221 376.33      Medications Ordered Today   Medications    trimethoprim-polymyxin b (POLYTRIM) ophthalmic solution     Sig: Administer 1 Drop to right eye every four (4) hours. Dispense:  5 mL     Refill:  0    cephALEXin (KEFLEX) 500 mg capsule     Sig: Take 1 Cap by mouth four (4) times daily for 7 days. Use if sxs not better develop swelling/redness around eye     Dispense:  28 Cap     Refill:  0     No results found for any visits on 01/23/19. The patients condition was discussed with the patient and they understand. The patient is to follow up with primary care doctor. If signs and symptoms become worse the pt is to go to the ER. The patient is to take medications as prescribed.

## 2019-02-01 ENCOUNTER — OFFICE VISIT (OUTPATIENT)
Dept: INTERNAL MEDICINE CLINIC | Age: 29
End: 2019-02-01

## 2019-02-01 VITALS
RESPIRATION RATE: 16 BRPM | HEART RATE: 80 BPM | SYSTOLIC BLOOD PRESSURE: 124 MMHG | HEIGHT: 65 IN | BODY MASS INDEX: 29.47 KG/M2 | TEMPERATURE: 98 F | DIASTOLIC BLOOD PRESSURE: 84 MMHG | WEIGHT: 176.9 LBS | OXYGEN SATURATION: 98 %

## 2019-02-01 DIAGNOSIS — Z87.820 HISTORY OF BRAIN CONCUSSION: ICD-10-CM

## 2019-02-01 DIAGNOSIS — M62.838 TRAPEZIUS MUSCLE SPASM: ICD-10-CM

## 2019-02-01 DIAGNOSIS — G44.229 CHRONIC TENSION-TYPE HEADACHE, NOT INTRACTABLE: Primary | ICD-10-CM

## 2019-02-01 RX ORDER — NAPROXEN 500 MG/1
500 TABLET ORAL 2 TIMES DAILY WITH MEALS
Qty: 30 TAB | Refills: 1 | Status: SHIPPED | OUTPATIENT
Start: 2019-02-01 | End: 2019-04-12

## 2019-02-01 NOTE — PROGRESS NOTES
Chief Complaint   Patient presents with    Neck Pain    Headache     she is a 33y.o. year old female who presents for follow-up of Headache     Pt has had headaches for a couple month(s). Description of pain: throbbing pain, sharp pain, bilateral in the temporal area, bilateral in the occipital area. Duration of individual headaches: 1 day(s), frequency every once and a blue moon but the past 3 days have been consistent. Associated symptoms: light sensitivity, stiff neck and visual disturbance. Pain relief: unable to obtain relief with OTC meds. Precipitating factors: patient is aware of none. She denies a history of recent head injury. Prior neurological history: negative for migraine headaches, previous concussion history last year and resolved. Reviewed and agree with Nurse Note and duplicated in this note. Reviewed PmHx, RxHx, FmHx, SocHx, AllgHx and updated and dated in the chart.     Family History   Problem Relation Age of Onset   Jose Guadalupe Mora Cancer Father     Diabetes Maternal Grandmother        Past Medical History:   Diagnosis Date    H/O craniotomy       Social History     Socioeconomic History    Marital status: SINGLE     Spouse name: Not on file    Number of children: Not on file    Years of education: Not on file    Highest education level: Not on file   Tobacco Use    Smoking status: Never Smoker    Smokeless tobacco: Never Used   Substance and Sexual Activity    Alcohol use: Yes     Comment: 1 x month, several beers     Drug use: No        Review of Systems - negative except as listed above      Objective:     Vitals:    02/01/19 1139   Weight: 176 lb 14.4 oz (80.2 kg)   Height: 5' 5\" (1.651 m)       Physical Examination: General appearance - alert, well appearing, and in no distress  Eyes - pupils equal and reactive, extraocular eye movements intact  Ears - bilateral TM's and external ear canals normal  Nose - normal and patent, no erythema, discharge or polyps  Mouth - mucous membranes moist, pharynx normal without lesions  Neck - supple, no significant adenopathy, tender to light palpation of upper traps bilaterally  Chest - clear to auscultation, no wheezes, rales or rhonchi, symmetric air entry  Heart - normal rate, regular rhythm, normal S1, S2, no murmurs, rubs, clicks or gallops  Abdomen - soft, nontender, nondistended, no masses or organomegaly  Extremities - peripheral pulses normal, no pedal edema, no clubbing or cyanosis  Skin - normal coloration and turgor, no rashes, no suspicious skin lesions noted    Assessment/ Plan:   Diagnoses and all orders for this visit:    1. Chronic tension-type headache, not intractable  -     REFERRAL TO NEUROLOGY    2. History of brain concussion  -     REFERRAL TO NEUROLOGY    3. Trapezius muscle spasm  -     REFERRAL TO PHYSICAL THERAPY    Other orders  -     naproxen (NAPROSYN) 500 mg tablet; Take 1 Tab by mouth two (2) times daily (with meals). Follow-up Disposition:  Return in about 4 weeks (around 3/1/2019), or if symptoms worsen or fail to improve. I have discussed the diagnosis with the patient and the intended plan as seen in the above orders. The patient has received an after-visit summary and questions were answered concerning future plans. Medication Side Effects and Warnings were discussed with patient,  Patient Labs were reviewed and or requested, and  Patient Past Records were reviewed and or requested  yes       Pt agrees to call or return to clinic and/or go to closest ER with any worsening of symptoms. This may include, but not limited to increased fever (>100.4) with NSAIDS or Tylenol, increased edema, confusion, rash, worsening of presenting symptoms.

## 2019-04-12 ENCOUNTER — OFFICE VISIT (OUTPATIENT)
Dept: FAMILY MEDICINE CLINIC | Age: 29
End: 2019-04-12

## 2019-04-12 VITALS
HEIGHT: 65 IN | WEIGHT: 168.5 LBS | SYSTOLIC BLOOD PRESSURE: 123 MMHG | TEMPERATURE: 97.9 F | BODY MASS INDEX: 28.07 KG/M2 | OXYGEN SATURATION: 98 % | DIASTOLIC BLOOD PRESSURE: 77 MMHG | HEART RATE: 66 BPM | RESPIRATION RATE: 18 BRPM

## 2019-04-12 DIAGNOSIS — F41.9 ANXIETY: ICD-10-CM

## 2019-04-12 DIAGNOSIS — F33.1 MODERATE EPISODE OF RECURRENT MAJOR DEPRESSIVE DISORDER (HCC): Primary | ICD-10-CM

## 2019-04-12 DIAGNOSIS — R51.9 SINUS HEADACHE: ICD-10-CM

## 2019-04-12 PROBLEM — F33.9 EPISODE OF RECURRENT MAJOR DEPRESSIVE DISORDER (HCC): Status: ACTIVE | Noted: 2019-04-12

## 2019-04-12 RX ORDER — SERTRALINE HYDROCHLORIDE 50 MG/1
50 TABLET, FILM COATED ORAL DAILY
Qty: 90 TAB | Refills: 1 | Status: SHIPPED | OUTPATIENT
Start: 2019-04-12 | End: 2019-08-07 | Stop reason: SDUPTHER

## 2019-04-12 RX ORDER — SERTRALINE HYDROCHLORIDE 25 MG/1
TABLET, FILM COATED ORAL DAILY
COMMUNITY
End: 2019-04-12 | Stop reason: DRUGHIGH

## 2019-04-12 RX ORDER — SERTRALINE HYDROCHLORIDE 50 MG/1
TABLET, FILM COATED ORAL DAILY
COMMUNITY
End: 2019-04-12 | Stop reason: SDUPTHER

## 2019-04-12 RX ORDER — HYDROXYZINE PAMOATE 25 MG/1
CAPSULE ORAL
Qty: 30 CAP | Refills: 1 | Status: SHIPPED | OUTPATIENT
Start: 2019-04-12 | End: 2020-02-11

## 2019-04-12 NOTE — PATIENT INSTRUCTIONS
1) Refill on zoloft 50mg and hydroxyzine 25mg. Take zoloft daily. Take hydroxyzine as needed (no more than 3 tabs in  A 24hour period) for anxiety. (This medicaiton can make you drowsy, so use with caution.)      Continue to take the 50mg zoloft daily which is a selective serotonin reuptake inhibitor (SSRI) used to treat low mood (depression) and/or anxiety. Serotonin is a chemical messenger in the brain that affects mood, social behavior, appetite, sexual desire, memory and sleep. SSRIs block the reabsorption of serotonin in the brain, so there is more serotonin available. Side effects may include drowsiness, insomnia, weight change, dry mouth. If you are having uncomfortable side effects, tell your healthcare provider. Some side effects go away in 2-3 weeks. It might be possible to find a dose or alternate medication that causes fewer side effects if they are still bothering you after 6 weeks of trying the medication. Finding the right medication (or combination of medications) and the right doses can take some trial and error, so try not to get discouraged. Clinical depression is a medical condition that goes beyond everyday sadness. Depression may cause serious, long-lasting symptoms and often disrupts a persons ability to perform routine tasks. The disorder is the most common psychiatric disorder worldwide. In the [place], about one in six people experiences a bout of clinical depression in their lifetime. There are multiple therapies available to help with depression including psychotherapy, exercise (aerobic exercise and yoga are highly recommended), proper diet and sleep as well as medications. Research shows that psychotherapy and antidepressants may be the best therapies for depression. Please look for a therapist.  Check with your insurance company for referrals for providers in the area that will be covered under your plan.      · Keep the numbers for these national suicide hotlines: 8-799-696-TALK (8-785.350.2623) and 0-276-ONUMUJK (2-437.592.6352). If you or someone you know talks about suicide or feeling hopeless, get help right away. ·   Steps you can do on your own to feel better:  Deep breathing exercises: Deep breathing triggers a relaxation response, helping to change from the \"fight-or-flight\" response anxiety brings on. Inhale slowly to a count of 4, starting at your belly and then moving into your chest.  Gently hold your breath for 4 counts, then slowly exhale to 4 counts. \"Clench\" exercise - clench various zones in your body for 30 seconds, then an overall body clench  Exercise can help many people feel less anxious. Regular cardiovascular exercise (such as fast walking,) release endorphins - the \"feel good\" hormone in our body - and can reduce anxiety. Proper sleep:  Sleep is important to overall health. Not getting enough sleep can cause fatigue, inattention, and irritabililty, causing anxiety levels to increase. Healthy Diet: a healthy diet rich in whole grains, vegetables and fruits is healthier than simple carbohydrates found in processed foods. Skipping meals can cause blood sugar to drop and cause jittery feelings that could worsening underlying anxiety. It also a good idea to cut down on or stop drinking coffee and other sources of caffeine. Caffeine can make anxiety worse. Find \"Me\" time - it is important to take some time just to focus on you and help alleviate stress in daily life. This could be daily exercise, walking the dog, sitting in a quiet place without distraction, meditation, etc.     Medical treatments include:  ? Psychotherapy - Psychotherapy involves meeting with a mental health counselor to talk about your feelings, relationships, and worries. Therapy can help you find new ways of thinking about your situation so that you feel less anxious. In therapy, you might also learn new skills to reduce anxiety.   You can go to Psychology Today's website to find a counselor. · Go to [email or web address]  · Enter your zip code. · Click on your insurance carrier (usually on left side of screen). · Then click any other parameters you desire. This will result in a list of providers. Click on any provider to learn more about them or see the contact information. Please choose a provider, call them, and schedule an appointment. ?Medicines - Medicines used to treat depression and can relieve anxiety. Some people have psychotherapy and take medicines at the same time. Phone apps that can help with anxiety:  CALM - Use the principles of mindfulness and meditation to ease your mind and keep anxiety at [facility]94. The goOutMapene interface is just the beginning. Once it opens, there are relaxing sounds and sleep stories. Enjoy guided meditations at various lengths to help with everything from building self-esteem to calming anxiety. JOIE - Self-help for Anxiety Management - range of self-help methods. Helps you understand what causes your anxiety, monitor your anxious thoughts and behavior over time and manage your anxiety through self-help exercises and private reflection. BOOSTERBUDDY   This lorena offers a novel way for teens and young adults to improve their resilience and work toward being healthier both physically and emotionally. 7 CUPS  7 Cups uses trained, volunteer, active listeners to provide free, anonymous, and confidential emotional support to people needing help coping with acute stressors and long-term mental health issues. Yanira Burnett 82 apps provide people with quick resources for tracking their cognitive distortions, activities, and safety plan in case of an emergency. SLEEPBOT   SleepBot is a quick and simple way for people to log their sleep and improve their sleep hygiene. [place] LORENA   The Whats Up?  lorena helps people monitor their mood and uses principles of CBT and acceptance commitment therapy (ACT) to help people reframe their thoughts and cognitive distortions. [address] [facility]: [phone]  Crisis: [address]: [phone]  Crisis: [address]: [phone]  Crisis: [phone]    2) Cerumen (ear wax) is a natural lubricating and protective substance secreted in the ear canal. Continue using Debrox on a scheduled basis to help remove the ear wax. .  It works by releasing oxygen in the ear, allowing the solution to foam and soften and loosen the wax. Place 5-7 drops in the ear canal before going to bed at night for 2-4 nights. You can use a washcloth wrapped around your finger to gently removed ear wax as it exits the ear canal.     Do NOT use Q-tips to clean your ears as this  pushes it further into the ear canal and packs the ear wax. 3)  You have been referred to Dr. Aramis Shepherd, Ears, Nose and Throat (ENT) specialist.  Please call his office and schedule an appointment. [address] [place] [address]  Ph: [phone]       Earwax Blockage: Care Instructions  Your Care Instructions    Earwax is a natural substance that protects the ear canal. Normally, earwax drains from the ears and does not cause problems. Sometimes earwax builds up and hardens. Earwax blockage (also called cerumen impaction) can cause some loss of hearing and pain. When wax is tightly packed, you will need to have your doctor remove it. Follow-up care is a key part of your treatment and safety. Be sure to make and go to all appointments, and call your doctor if you are having problems. It's also a good idea to know your test results and keep a list of the medicines you take. How can you care for yourself at home? · Do not try to remove earwax with cotton swabs, fingers, or other objects.  This can make the blockage worse and damage the eardrum. · If your doctor recommends that you try to remove earwax at home:  ? Soften and loosen the earwax with warm mineral oil. You also can try hydrogen peroxide mixed with an equal amount of room temperature water. Place 2 drops of the fluid, warmed to body temperature, in the ear two times a day for up to 5 days. ? Once the wax is loose and soft, all that is usually needed to remove it from the ear canal is a gentle, warm shower. Direct the water into the ear, then tip your head to let the earwax drain out. Dry your ear thoroughly with a hair dryer set on low. Hold the dryer several inches from your ear. ? If the warm mineral oil and shower do not work, use an over-the-counter wax softener. Read and follow all instructions on the label. After using the wax softener, use an ear syringe to gently flush the ear. Make sure the flushing solution is body temperature. Cool or hot fluids in the ear can cause dizziness. When should you call for help? Call your doctor now or seek immediate medical care if:    · Pus or blood drains from your ear.     · Your ears are ringing or feel full.     · You have a loss of hearing.    Watch closely for changes in your health, and be sure to contact your doctor if:    · You have pain or reduced hearing after 1 week of home treatment.     · You have any new symptoms, such as nausea or balance problems. Where can you learn more? Go to http://feng-padmini.info/. Enter U547 in the search box to learn more about \"Earwax Blockage: Care Instructions. \"  Current as of: September 23, 2018  Content Version: 11.9  © 1120-4591 [employer]. Care instructions adapted under license by [employer] (which disclaims liability or warranty for this information).  If you have questions about a medical condition or this instruction, always ask your healthcare professional. Yvonne Zavala disclaims any warranty or liability for your use of this information.

## 2019-04-12 NOTE — PROGRESS NOTES
Jonna Pawel  Identified pt with two pt identifiers(name and ). Chief Complaint   Patient presents with    Medication Evaluation     rm 10       1. Have you been to the ER, urgent care clinic since your last visit? no Hospitalized since your last visit? no    2. Have you seen or consulted any other health care providers outside of the [address] [place] [address] since your last visit? Include any pap smears or colon screening. no      Advance Care Planning    In the event something were to happen to you and you were unable to speak on your behalf, do you have an Advance Directive/ Living Will in place stating your wishes? NO    If yes, do we have a copy on file NO    If no, would you like information NO    Medication reconciliation up to date and corrected with patient at this time. Today's provider has been notified of reason for visit, vitals and flowsheets obtained on patients. Reviewed record in preparation for visit, huddled with provider and have obtained necessary documentation. Health Maintenance Due   Topic    DTaP/Tdap/Td series (1 - Tdap)       Wt Readings from Last 3 Encounters:   19 176 lb 14.4 oz (80.2 kg)   19 170 lb (77.1 kg)   10/25/18 169 lb (76.7 kg)     Temp Readings from Last 3 Encounters:   19 98 °F (36.7 °C) (Oral)   19 98.6 °F (37 °C)   10/25/18 98.3 °F (36.8 °C) (Oral)     BP Readings from Last 3 Encounters:   19 124/84   19 122/77   10/25/18 (!) 122/92     Pulse Readings from Last 3 Encounters:   19 80   19 97   10/25/18 76     There were no vitals filed for this visit.       Learning Assessment:  :     Learning Assessment 2019 2017 10/11/2016   PRIMARY LEARNER Patient Patient Patient   HIGHEST LEVEL OF EDUCATION - PRIMARY LEARNER  - - GRADUATED HIGH SCHOOL OR GED   BARRIERS PRIMARY LEARNER NONE - NONE   CO-LEARNER CAREGIVER - - No   PRIMARY LANGUAGE ENGLISH ENGLISH ENGLISH   LEARNER PREFERENCE PRIMARY DEMONSTRATION DEMONSTRATION DEMONSTRATION   ANSWERED BY self Self pt   RELATIONSHIP SELF SELF SELF       Depression Screening:  :     3 most recent PHQ Screens 2/1/2019   Little interest or pleasure in doing things Not at all   Feeling down, depressed, irritable, or hopeless Not at all   Total Score PHQ 2 0       No flowsheet data found. Fall Risk Assessment:  :     Fall Risk Assessment, last 12 mths 9/26/2017   Able to walk? Yes   Fall in past 12 months? No       Abuse Screening:  :     Abuse Screening Questionnaire 9/26/2017   Do you ever feel afraid of your partner? N   Are you in a relationship with someone who physically or mentally threatens you? N   Is it safe for you to go home? Y       ADL Screening:  :     ADL Assessment 9/7/2018   Feeding yourself No Help Needed   Getting from bed to chair No Help Needed   Getting dressed No Help Needed   Bathing or showering No Help Needed   Walk across the room (includes cane/walker) No Help Needed   Using the telphone No Help Needed   Taking your medications No Help Needed   Preparing meals No Help Needed   Managing money (expenses/bills) No Help Needed   Moderately strenuous housework (laundry) No Help Needed   Shopping for personal items (toiletries/medicines) No Help Needed   Shopping for groceries No Help Needed   Driving No Help Needed   Climbing a flight of stairs No Help Needed   Getting to places beyond walking distances No Help Needed           BMI:  Weight Metrics 2/1/2019 1/23/2019 10/25/2018 9/7/2018 10/25/2017 10/10/2017 9/26/2017   Weight 176 lb 14.4 oz 170 lb 169 lb 170 lb 8 oz 165 lb 165 lb 170 lb   BMI 29.44 kg/m2 28.29 kg/m2 27.99 kg/m2 27.73 kg/m2 27.46 kg/m2 27.46 kg/m2 27.44 kg/m2           Medication reconciliation up to date and corrected with patient at this time.
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
## 2019-08-07 ENCOUNTER — OFFICE VISIT (OUTPATIENT)
Dept: URGENT CARE | Age: 29
End: 2019-08-07

## 2019-08-07 VITALS
DIASTOLIC BLOOD PRESSURE: 61 MMHG | HEIGHT: 65 IN | SYSTOLIC BLOOD PRESSURE: 127 MMHG | BODY MASS INDEX: 28.16 KG/M2 | HEART RATE: 90 BPM | OXYGEN SATURATION: 98 % | TEMPERATURE: 98.8 F | WEIGHT: 169 LBS | RESPIRATION RATE: 18 BRPM

## 2019-08-07 DIAGNOSIS — F41.0 PANIC ANXIETY SYNDROME: Primary | ICD-10-CM

## 2019-08-07 RX ORDER — SERTRALINE HYDROCHLORIDE 50 MG/1
TABLET, FILM COATED ORAL
Qty: 90 TAB | Refills: 1 | Status: SHIPPED | OUTPATIENT
Start: 2019-08-07 | End: 2020-02-11 | Stop reason: ALTCHOICE

## 2019-08-07 RX ORDER — PROPRANOLOL HYDROCHLORIDE 20 MG/1
20 TABLET ORAL
Qty: 30 TAB | Refills: 0 | Status: SHIPPED | OUTPATIENT
Start: 2019-08-07 | End: 2020-02-11 | Stop reason: SDUPTHER

## 2019-08-07 NOTE — PROGRESS NOTES
Palpitations    The history is provided by the patient. This is a new problem. The current episode started yesterday. The problem has not changed since onset. The problem occurs constantly. The problem is associated with anxiety, fear and stress. Associated symptoms include diaphoresis (yesterday ), chest pressure, irregular heartbeat and headaches. Pertinent negatives include no fever, no numbness, no chest pain and no shortness of breath. Risk factors include stress. Treatments tried: atarax. The treatment provided mild relief.         Past Medical History:   Diagnosis Date    H/O craniotomy         Past Surgical History:   Procedure Laterality Date    HX ACL RECONSTRUCTION      HX OTHER SURGICAL      Brain Surgery/Craniotomy          Family History   Problem Relation Age of Onset    Cancer Father     Diabetes Maternal Grandmother         Social History     Socioeconomic History    Marital status: SINGLE     Spouse name: Not on file    Number of children: Not on file    Years of education: Not on file    Highest education level: Not on file   Occupational History    Not on file   Social Needs    Financial resource strain: Not on file    Food insecurity:     Worry: Not on file     Inability: Not on file    Transportation needs:     Medical: Not on file     Non-medical: Not on file   Tobacco Use    Smoking status: Never Smoker    Smokeless tobacco: Never Used   Substance and Sexual Activity    Alcohol use: Yes     Comment: 1 x month, several beers     Drug use: No    Sexual activity: Not on file   Lifestyle    Physical activity:     Days per week: Not on file     Minutes per session: Not on file    Stress: Not on file   Relationships    Social connections:     Talks on phone: Not on file     Gets together: Not on file     Attends Yazdanism service: Not on file     Active member of club or organization: Not on file     Attends meetings of clubs or organizations: Not on file     Relationship status: Not on file    Intimate partner violence:     Fear of current or ex partner: Not on file     Emotionally abused: Not on file     Physically abused: Not on file     Forced sexual activity: Not on file   Other Topics Concern    Not on file   Social History Narrative    Not on file                ALLERGIES: Patient has no known allergies. Review of Systems   Constitutional: Positive for diaphoresis (yesterday ). Negative for fever. Respiratory: Negative for shortness of breath. Cardiovascular: Positive for palpitations. Negative for chest pain. Neurological: Positive for headaches. Negative for numbness. All other systems reviewed and are negative. Vitals:    08/07/19 1414 08/07/19 1454   BP: 151/72 127/61   Pulse: 90    Resp: 18    Temp: 98.8 °F (37.1 °C)    SpO2: 98%    Weight: 169 lb (76.7 kg)    Height: 5' 5\" (1.651 m)        Physical Exam   Constitutional: No distress. HENT:   Right Ear: Tympanic membrane and ear canal normal.   Left Ear: Tympanic membrane and ear canal normal.   Nose: Nose normal.   Mouth/Throat: No oropharyngeal exudate, posterior oropharyngeal edema or posterior oropharyngeal erythema. Eyes: Conjunctivae are normal. Right eye exhibits no discharge. Left eye exhibits no discharge. Neck: Neck supple. Cardiovascular: Normal rate, normal heart sounds and intact distal pulses. No murmur heard. Pulmonary/Chest: Effort normal and breath sounds normal. No respiratory distress. She has no wheezes. She has no rales. Lymphadenopathy:     She has no cervical adenopathy. Skin: No rash noted. Psychiatric: She has a normal mood and affect. Her behavior is normal. Judgment and thought content normal.   Nursing note and vitals reviewed. MDM    Procedures    ICD-10-CM ICD-9-CM    1.  Panic anxiety syndrome F41.0 300.01 EKG, 12 LEAD, INITIAL      EKG, 12 LEAD, INITIAL     Medications Ordered Today   Medications    propranolol (INDERAL) 20 mg tablet     Sig: Take 1 Tab by mouth three (3) times daily as needed (palpitation/ chest pain). Dispense:  30 Tab     Refill:  0     No results found for any visits on 08/07/19. The patients condition was discussed with the patient and they understand. The patient is to follow up with primary care doctor. If signs and symptoms become worse the pt is to go to the ER. The patient is to take medications as prescribed.

## 2019-08-07 NOTE — LETTER
[address] 
[facility] [place] [address] 
[phone] Work/School Note Date: 8/7/2019 To Whom It May concern: 
 
Luis Alfredo Harper was seen and treated today in the urgent care center by the following provider(s): 
No providers found. Luis Alfredo Harper may return to work on 8/8/19. Sincerely, Abiodun Thompson MD

## 2019-08-07 NOTE — TELEPHONE ENCOUNTER
PCP: Kit Cameron MD    Last appt: 4/12/2019  No future appointments.     Requested Prescriptions     Pending Prescriptions Disp Refills    sertraline (ZOLOFT) 50 mg tablet [Pharmacy Med Name: SERTRALINE HCL 50 MG TABLET] 90 Tab 1     Sig: TAKE 1 TABLET BY MOUTH EVERY DAY       Prior labs and Blood pressures:  BP Readings from Last 3 Encounters:   04/12/19 123/77   02/01/19 124/84   01/23/19 122/77     Lab Results   Component Value Date/Time    Sodium 140 12/14/2016 07:54 AM    Potassium 5.1 12/14/2016 07:54 AM    Chloride 101 12/14/2016 07:54 AM    CO2 26 12/14/2016 07:54 AM    Glucose 87 12/14/2016 07:54 AM    BUN 16 12/14/2016 07:54 AM    Creatinine 0.77 12/14/2016 07:54 AM    BUN/Creatinine ratio 21 (H) 12/14/2016 07:54 AM    GFR est  12/14/2016 07:54 AM    GFR est non- 12/14/2016 07:54 AM    Calcium 10.2 12/14/2016 07:54 AM     No results found for: HBA1C, HGBE8, VRZ4PSUY, PGZ9ICAA  Lab Results   Component Value Date/Time    Cholesterol, total 211 (H) 10/11/2016 08:23 AM    HDL Cholesterol 90 10/11/2016 08:23 AM    LDL, calculated 103 (H) 10/11/2016 08:23 AM    VLDL, calculated 18 10/11/2016 08:23 AM    Triglyceride 89 10/11/2016 08:23 AM     No results found for: SHELLIE Andersen    Lab Results   Component Value Date/Time    TSH 1.200 12/14/2016 07:54 AM

## 2019-08-07 NOTE — PATIENT INSTRUCTIONS

## 2020-02-11 ENCOUNTER — OFFICE VISIT (OUTPATIENT)
Dept: FAMILY MEDICINE CLINIC | Age: 30
End: 2020-02-11

## 2020-02-11 VITALS
SYSTOLIC BLOOD PRESSURE: 121 MMHG | WEIGHT: 182.5 LBS | DIASTOLIC BLOOD PRESSURE: 76 MMHG | HEIGHT: 66 IN | TEMPERATURE: 98 F | RESPIRATION RATE: 18 BRPM | OXYGEN SATURATION: 98 % | BODY MASS INDEX: 29.33 KG/M2 | HEART RATE: 74 BPM

## 2020-02-11 DIAGNOSIS — F33.1 MODERATE EPISODE OF RECURRENT MAJOR DEPRESSIVE DISORDER (HCC): ICD-10-CM

## 2020-02-11 DIAGNOSIS — F41.9 ANXIETY: ICD-10-CM

## 2020-02-11 DIAGNOSIS — F90.9 ATTENTION DEFICIT HYPERACTIVITY DISORDER (ADHD), UNSPECIFIED ADHD TYPE: Primary | ICD-10-CM

## 2020-02-11 RX ORDER — BUPROPION HYDROCHLORIDE 150 MG/1
150 TABLET ORAL
Qty: 30 TAB | Refills: 0 | Status: SHIPPED | OUTPATIENT
Start: 2020-02-11 | End: 2020-02-11 | Stop reason: CLARIF

## 2020-02-11 RX ORDER — BUPROPION HYDROCHLORIDE 150 MG/1
300 TABLET ORAL
Qty: 60 TAB | Refills: 1 | Status: SHIPPED | OUTPATIENT
Start: 2020-02-11 | End: 2020-03-09

## 2020-02-11 RX ORDER — PROPRANOLOL HYDROCHLORIDE 20 MG/1
20 TABLET ORAL
Qty: 30 TAB | Refills: 0 | Status: SHIPPED | OUTPATIENT
Start: 2020-02-11

## 2020-02-11 NOTE — PROGRESS NOTES
Chief Complaint   Patient presents with    Medication Check    Anxiety         1. Have you been to the ER, urgent care clinic since your last visit? Hospitalized since your last visit? Yes ER at [facility] [place] for flu systoms. Negative for flu but temp 103.4 tamifu given. 2. Have you seen or consulted any other health care providers outside of the [address] [place] [address] since your last visit? Include any pap smears or colon screening.   no

## 2020-02-11 NOTE — PATIENT INSTRUCTIONS
1) Wellbutrin (bupropion) is an antidepressant medication used for depression, seasonal affective disorder, as well as smoking cessation. It is a norepinephrine-dopamine reuptake inhibitor (NDRI) and works by increasing levels of the neurotransmitters norepinephrine, (dopamine). As well as being an antidepressant, bupropion is an antagonist at nicotinic receptors. This means that it blocks receptors where nicotine normally binds, making it useful in smoking cessation. Please begin taking Wellbutrin  mg in the morning (it can cause restlessness if taken at night). If you aren't feeling better in 1-2 weeks, then increase the dose to 2 tabs (300mg) daily. Side effects of wellbutrin are nausea, dry mouth, dizziness, heart burn and insomnia. Anxiety is a common problem. It affects all kinds of people. There is no reason to feel embarrassed about getting treatment for anxiety. Whether you have occasional anxiety or a diagnosable disorder, there are steps you can take to help minimize and manage feeling of anxiety. Everyone feels anxious or nervous once in a while. That is normal. But being extremely anxious or worried on most days for 6 months or longer is not normal, and is considered a medical problem. This is called \"generalized anxiety disorder. \" The disorder can make it hard to do everyday tasks. Generalized anxiety disorder is just one anxiety disorder. There are others, such as panic disorder and phobias. Symptoms of extreme or severe anxiety:  People with extreme or severe anxiety feel very worried or \"on edge\" much of the time. They can have trouble sleeping or forget things. Plus, they can have physical symptoms. For instance, people with severe anxiety often feel very tired and have tense muscles. Some get stomach aches or feel chest \"tightness. \"    Steps you can do on your own to feel better:  Deep breathing exercises: Deep breathing triggers a relaxation response, helping to change from the \"fight-or-flight\" response anxiety brings on. Inhale slowly to a count of 4, starting at your belly and then moving into your chest.  Gently hold your breath for 4 counts, then slowly exhale to 4 counts. \"Clench\" exercise - clench various zones in your body for 30 seconds, then an overall body clench  Exercise can help many people feel less anxious. Regular cardiovascular exercise (such as fast walking,) release endorphins - the \"feel good\" hormone in our body - and can reduce anxiety. Proper sleep:  Sleep is important to overall health. Not getting enough sleep can cause fatigue, inattention, and irritabililty, causing anxiety levels to increase. Healthy Diet: a healthy diet rich in whole grains, vegetables and fruits is healthier than simple carbohydrates found in processed foods. Skipping meals can cause blood sugar to drop and cause jittery feelings that could worsening underlying anxiety. It also a good idea to cut down on or stop drinking coffee and other sources of caffeine. Caffeine can make anxiety worse. Find \"Me\" time - it is important to take some time just to focus on you and help alleviate stress in daily life. This could be daily exercise, walking the dog, sitting in a quiet place without distraction, meditation, etc.     Medical treatments include:  ? Psychotherapy - Psychotherapy involves meeting with a mental health counselor to talk about your feelings, relationships, and worries. Therapy can help you find new ways of thinking about your situation so that you feel less anxious. In therapy, you might also learn new skills to reduce anxiety. Please call your insurance company to get a list of therapists that will be covered and make an appointment. Alternatively, you can go to Psychology Today's website to find a counselor. · Go to [email or web address]  · Enter your zip code. · Click on your insurance carrier (usually on left side of screen).   · Then click any other parameters you desire. This will result in a list of providers. Click on any provider to learn more about them or see the contact information. Please choose a provider, call them, and schedule an appointment. ?Medicines - Medicines used to treat depression can relieve anxiety, too, even in people who are not depressed. Some people have psychotherapy and take medicines at the same time. Phone apps that can help with anxiety:  CALM - Use the principles of mindfulness and meditation to ease your mind and keep anxiety at [address]. The iTwixie interface is just the beginning. Once it opens, there are relaxing sounds and sleep stories. Enjoy guided meditations at various lengths to help with everything from building self-esteem to calming anxiety. SIMPLE HABIT - guided meditation for anxiety relief    JOIE - Self-help for Anxiety Management - range of self-help methods. Helps you understand what causes your anxiety, monitor your anxious thoughts and behavior over time and manage your anxiety through self-help exercises and private reflection. BOOSTERBUDDY   This mark offers a novel way for teens and young adults to improve their resilience and work toward being healthier both physically and emotionally. 7 CUPS  7 Cups uses trained, volunteer, active listeners to provide free, anonymous, and confidential emotional support to people needing help coping with acute stressors and long-term mental health issues. Yanira Burnett 82 apps provide people with quick resources for tracking their cognitive distortions, activities, and safety plan in case of an emergency. SLEEPBOT   SleepBot is a quick and simple way for people to log their sleep and improve their sleep hygiene. [employer] MARK   The Whats Up?  mark helps people monitor their mood and uses principles of CBT and acceptance commitment therapy (ACT) to help people reframe their thoughts and cognitive distortions. Keep the numbers for these national suicide hotlines: 5-732-306-TALK (0-586.119.7865) and 2-072-PBJLLKE (9-705.276.5341). If you or someone you know talks about suicide or feeling hopeless, get help right away. [address]: [phone]  Crisis: [address]: [phone]  Crisis: [address]: [phone]  Crisis: [phone]      2)  Healthy Weight  Body Mass Index is a noninvasive way to screen for weight and body fat. This is a mathematical calculation based on your height and weight. A healthy BMI is between 20% -24.9%. Your BMI is 29 %. There is a relationship between high BMI and various healthy problems, such as osteoarthritis, muscle pain, increased risk of cancer, diabetes, heart disease, stroke, hypertension, high cholesterol, sleep apnea, breathing problems, depression, which is why a healthy BMI is so important. In terms of weight loss, a 5-10% reduction in body weight over 3-6 months is a reasonable goal.  There would likely be improvements in risk for disease such as diabetes and heart disease, with this amount of weight loss. In order to lose weight, try reducing your daily intake of calories by decreasing portion size of food and increase exercise to help reduce weight. Eat 3-5 small meals per day instead of 3 large meals. A good tip to losing weight : DON'T EAT OR DRINK ANYTHING AFTER DINNER! Choose lean meats for protein source which include chicken, pork, and turkey. The recommended serving size for protein is a 2-3 oz serving (the size of your fist), and 1-1.5 oz of carbohydrate per meal (about 1 cup). Increase servings of fruits and vegetables. Limit processed carbohydrates, (i.e. most breads, crackers, pasta, chips, rice, breaded or battered food, etc).  If you choose to eat carbohydrates, whole wheat, (instead of white) is a healthier option for bread, rice, and crackers. Avoid fried foods. Limit sugar. Do your best to avoid all sweetened beverages, such as alcohol, juice, sodas or sweet teas, drink water, unsweet tea, diet soda, or crystal light as options instead. (Don't drink more than 2 of the 12oz artificially sweetened drinks per day as these can increase hunger and make it more difficult to lose weight. The daily goal for water intake should be at least 64 oz/day for most people. Daily exercise will also help with weight loss and overall health. A minimum of 150 minutes a week of moderate exercise is recommended (30 minutes per day). Make exercise a routine part of your day - for example, park in spaces far away from [facility], take stairs instead of elevator, if sitting for long periods, get up from chair and walk every hour. Recruit a friend or relative to exercise with you and keep you on schedule. It is much easier to exercise with a consuelo who will make sure you work out each day! Home DVDs can be a great way to work out, if you will do them (otherwise, they aren't worth the money!) [employer] is a eight week mixed martial arts (MMA) based workout. It has 5 main workout DVDs, each one is 45 minutes consisting of a 10 minute warm-up, five 5 minute workouts and a 6 minute stretching/cool down. It is easy to follow, with options to modify each move and you only need hand weights and some space to do the work out. Meal planning smart phone applications like Fishidy can help plan healthy meals. Get 7-8 hours of sleep each night. 2) Anxiety is a common problem. It affects all kinds of people. There is no reason to feel embarrassed about getting treatment for anxiety. Whether you have occasional anxiety or a diagnosable disorder, there are steps you can take to help minimize and manage feeling of anxiety.       Everyone feels anxious or nervous once in a while. That is normal. But being extremely anxious or worried on most days for 6 months or longer is not normal, and is considered a medical problem. This is called \"generalized anxiety disorder. \" The disorder can make it hard to do everyday tasks. Generalized anxiety disorder is just one anxiety disorder. There are others, such as panic disorder and phobias. Symptoms of extreme or severe anxiety:  People with extreme or severe anxiety feel very worried or \"on edge\" much of the time. They can have trouble sleeping or forget things. Plus, they can have physical symptoms. For instance, people with severe anxiety often feel very tired and have tense muscles. Some get stomach aches or feel chest \"tightness. \"    Steps you can do on your own to feel better:  Deep breathing exercises: Deep breathing triggers a relaxation response, helping to change from the \"fight-or-flight\" response anxiety brings on. Inhale slowly to a count of 4, starting at your belly and then moving into your chest.  Gently hold your breath for 4 counts, then slowly exhale to 4 counts. \"Clench\" exercise - clench various zones in your body for 30 seconds, then an overall body clench  Exercise can help many people feel less anxious. Regular cardiovascular exercise (such as fast walking,) release endorphins - the \"feel good\" hormone in our body - and can reduce anxiety. Proper sleep:  Sleep is important to overall health. Not getting enough sleep can cause fatigue, inattention, and irritabililty, causing anxiety levels to increase. Healthy Diet: a healthy diet rich in whole grains, vegetables and fruits is healthier than simple carbohydrates found in processed foods. Skipping meals can cause blood sugar to drop and cause jittery feelings that could worsening underlying anxiety. It also a good idea to cut down on or stop drinking coffee and other sources of caffeine. Caffeine can make anxiety worse.   Find \"Me\" time - it is important to take some time just to focus on you and help alleviate stress in daily life. This could be daily exercise, walking the dog, sitting in a quiet place without distraction, meditation, etc.     Medical treatments include:  ? Psychotherapy - Psychotherapy involves meeting with a mental health counselor to talk about your feelings, relationships, and worries. Therapy can help you find new ways of thinking about your situation so that you feel less anxious. In therapy, you might also learn new skills to reduce anxiety. Please call your insurance company to get a list of therapists that will be covered and make an appointment. Alternatively, you can go to Psychology Today's website to find a counselor. · Go to [email or web address]  · Enter your zip code. · Click on your insurance carrier (usually on left side of screen). · Then click any other parameters you desire. This will result in a list of providers. Click on any provider to learn more about them or see the contact information. Please choose a provider, call them, and schedule an appointment. ?Medicines - Medicines used to treat depression can relieve anxiety, too, even in people who are not depressed. Some people have psychotherapy and take medicines at the same time. Phone apps that can help with anxiety:  CALM - Use the principles of mindfulness and meditation to ease your mind and keep anxiety at [facility] 994. The Truzip interface is just the beginning. Once it opens, there are relaxing sounds and sleep stories. Enjoy guided meditations at various lengths to help with everything from building self-esteem to calming anxiety. SIMPLE HABIT - guided meditation for anxiety relief    JOIE - Self-help for Anxiety Management - range of self-help methods. Helps you understand what causes your anxiety, monitor your anxious thoughts and behavior over time and manage your anxiety through self-help exercises and private reflection.     BOOSTERBUDDY   This mark offers a novel way for teens and young adults to improve their resilience and work toward being healthier both physically and emotionally. 7 CUPS  7 Cups uses trained, volunteer, active listeners to provide free, anonymous, and confidential emotional support to people needing help coping with acute stressors and long-term mental health issues. [employer] apps provide people with quick resources for tracking their cognitive distortions, activities, and safety plan in case of an emergency. SLEEPBOT   SleepBot is a quick and simple way for people to log their sleep and improve their sleep hygiene. [facility] MARK   The Whats Up? mark helps people monitor their mood and uses principles of CBT and acceptance commitment therapy (ACT) to help people reframe their thoughts and cognitive distortions. Keep the numbers for these national suicide hotlines: 1-328-373-TALK (3-260.618.5163) and 7-634-NACVQVS (1-601.856.7989). If you or someone you know talks about suicide or feeling hopeless, get help right away. [address] [place] [address] St: [phone]  Crisis: [address] [place] [address] Avenue: [phone]  Crisis: [facility]: [phone]  Crisis: [phone]    3) Some books on depression:      The Upward Spiral, by Neuroscientist Elizabeth Calhoun, PhD, - explains the process in your brain that causes depression. Using this information, he outlines tips for how you can apply neuroscience research to rewire your brain toward healthier, happier thoughts. \"The Wilcox Demon, by Manuel Laurent - explores depression from several angles, including his personal struggles.  Talks about why depression and its treatments are so complex according to doctors, policy makers, scientists, drug makers, and people living with it.      by Cameron Tinsley - (fictional account)    4) [address] [facility]  - [phone]    Research shows that psychotherapy and antidepressants may be the best therapies for depression. Please look for a therapist.  Check with your insurance company for a list of providers in the area that will be covered under your plan. Mental health services are limited, so it may take time to find a practice with an opening. You will want to have a good therapeutic relationship, so if you don't feel comfortable with the therapist when you meet, look for another one until you find a therapist you like. Alternatively, search the Psychology Today website    · Go to [email or web address]  · Enter your zip code. · Click on your insurance carrier (usually on left side of screen). · Then click any other parameters you desire.      This will result in a list of providers. Click on any provider to learn more about them or see the contact information. Please choose a provider, call them, and schedule an appointment. Learning About the [facility] Diet  What is the Mediterranean diet? The Mediterranean diet is a style of eating rather than a diet plan. It features foods eaten in [place], [place], [place], and other countries along the [facility]. It emphasizes eating foods like fish, fruits, vegetables, beans, high-fiber breads and whole grains, nuts, and olive oil. This style of eating includes limited red meat, cheese, and sweets. Why choose the Mediterranean diet? A Mediterranean-style diet may improve heart health. It contains more fat than other heart-healthy diets. But the fats are mainly from nuts, unsaturated oils (such as fish oils and olive oil), and certain nut or seed oils (such as canola, soybean, or flaxseed oil). These fats may help protect the heart and blood vessels. How can you get started on the Mediterranean diet?   Here are some things you can do to switch to a more Mediterranean way of eating. What to eat  · Eat a variety of fruits and vegetables each day, such as grapes, blueberries, tomatoes, broccoli, peppers, figs, olives, spinach, eggplant, beans, lentils, and chickpeas. · Eat a variety of whole-grain foods each day, such as oats, brown rice, and whole wheat bread, pasta, and couscous. · Eat fish at least 2 times a week. Try tuna, salmon, mackerel, lake trout, herring, or sardines. · Eat moderate amounts of low-fat dairy products, such as milk, cheese, or yogurt. · Eat moderate amounts of poultry and eggs. · Choose healthy (unsaturated) fats, such as nuts, olive oil, and certain nut or seed oils like canola, soybean, and flaxseed. · Limit unhealthy (saturated) fats, such as butter, palm oil, and coconut oil. And limit fats found in animal products, such as meat and dairy products made with whole milk. Try to eat red meat only a few times a month in very small amounts. · Limit sweets and desserts to only a few times a week. This includes sugar-sweetened drinks like soda. The Mediterranean diet may also include red wine with your meal--1 glass each day for women and up to 2 glasses a day for men. Tips for eating at home  · Use herbs, spices, garlic, lemon zest, and citrus juice instead of salt to add flavor to foods. · Add avocado slices to your sandwich instead of east. · Have fish for lunch or dinner instead of red meat. Brush the fish with olive oil, and broil or grill it. · Sprinkle your salad with seeds or nuts instead of cheese. · Cook with olive or canola oil instead of butter or oils that are high in saturated fat. · Switch from 2% milk or whole milk to 1% or fat-free milk. · Dip raw vegetables in a vinaigrette dressing or hummus instead of dips made from mayonnaise or sour cream.  · Have a piece of fruit for dessert instead of a piece of cake. Try baked apples, or have some dried fruit.   Tips for eating out  · Try broiled, grilled, baked, or poached fish instead of having it fried or breaded. · Ask your  to have your meals prepared with olive oil instead of butter. · Order dishes made with marinara sauce or sauces made from olive oil. Avoid sauces made from cream or mayonnaise. · Choose whole-grain breads, whole wheat pasta and pizza crust, brown rice, beans, and lentils. · Cut back on butter or margarine on bread. Instead, you can dip your bread in a small amount of olive oil. · Ask for a side salad or grilled vegetables instead of french fries or chips. Where can you learn more? Go to http://feng-padmini.info/. Enter [phone] in the search box to learn more about \"Learning About the Mediterranean Diet. \"  Current as of: November 7, 2018  Content Version: 12.2  © 0514-2116 [employer]. Care instructions adapted under license by [employer] (which disclaims liability or warranty for this information). If you have questions about a medical condition or this instruction, always ask your healthcare professional. [address] any warranty or liability for your use of this information.
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
## 2020-03-04 ENCOUNTER — OFFICE VISIT (OUTPATIENT)
Dept: URGENT CARE | Age: 30
End: 2020-03-04

## 2020-03-04 VITALS
HEART RATE: 60 BPM | RESPIRATION RATE: 17 BRPM | WEIGHT: 186 LBS | BODY MASS INDEX: 29.89 KG/M2 | DIASTOLIC BLOOD PRESSURE: 86 MMHG | OXYGEN SATURATION: 98 % | TEMPERATURE: 98.9 F | HEIGHT: 66 IN | SYSTOLIC BLOOD PRESSURE: 130 MMHG

## 2020-03-04 DIAGNOSIS — R05.9 COUGH: ICD-10-CM

## 2020-03-04 DIAGNOSIS — J06.9 VIRAL URI WITH COUGH: Primary | ICD-10-CM

## 2020-03-04 DIAGNOSIS — J98.01 ACUTE BRONCHOSPASM: ICD-10-CM

## 2020-03-04 LAB
FLUAV+FLUBV AG NOSE QL IA.RAPID: NEGATIVE POS/NEG
FLUAV+FLUBV AG NOSE QL IA.RAPID: NEGATIVE POS/NEG
S PYO AG THROAT QL: NEGATIVE
VALID INTERNAL CONTROL?: YES
VALID INTERNAL CONTROL?: YES

## 2020-03-04 RX ORDER — PREDNISONE 20 MG/1
20 TABLET ORAL 2 TIMES DAILY
Qty: 10 TAB | Refills: 0 | Status: SHIPPED | OUTPATIENT
Start: 2020-03-04 | End: 2020-03-09

## 2020-03-04 RX ORDER — AMOXICILLIN 875 MG/1
875 TABLET, FILM COATED ORAL 2 TIMES DAILY
Qty: 20 TAB | Refills: 0 | Status: SHIPPED | OUTPATIENT
Start: 2020-03-04 | End: 2020-03-14

## 2020-03-04 NOTE — PATIENT INSTRUCTIONS
Saline Nasal Washes: Care Instructions Your Care Instructions Saline nasal washes help keep the nasal passages open by washing out thick or dried mucus. This simple remedy can help relieve symptoms of allergies, sinusitis, and colds. It also can make the nose feel more comfortable by keeping the mucous membranes moist. You may notice a little burning sensation in your nose the first few times you use the solution, but this usually gets better in a few days. Follow-up care is a key part of your treatment and safety. Be sure to make and go to all appointments, and call your doctor if you are having problems. It's also a good idea to know your test results and keep a list of the medicines you take. How can you care for yourself at home? · You can buy premixed saline solution in a squeeze bottle or other sinus rinse products at a drugstore. Read and follow the instructions on the label. · You also can make your own saline solution by adding 1 teaspoon of salt and 1 teaspoon of baking soda to 2 cups of distilled water. · If you use a homemade solution, pour a small amount into a clean bowl. Using a rubber bulb syringe, squeeze the syringe and place the tip in the salt water. Pull a small amount of the salt water into the syringe by relaxing your hand. · Sit down with your head tilted slightly back. Do not lie down. Put the tip of the bulb syringe or the squeeze bottle a little way into one of your nostrils. Gently drip or squirt a few drops into the nostril. Repeat with the other nostril. Some sneezing and gagging are normal at first. 
· Gently blow your nose. · Wipe the syringe or bottle tip clean after each use. · Repeat this 2 or 3 times a day. · Use nasal washes gently if you have nosebleeds often. When should you call for help? Watch closely for changes in your health, and be sure to contact your doctor if: 
  · You often get nosebleeds.  
  · You have problems doing the nasal washes.   
Where can you learn more? Go to http://feng-padmini.info/. Enter [id number] [id number] in the search box to learn more about \"Saline Nasal Washes: Care Instructions. \" Current as of: October 21, 2018 Content Version: 12.2 © 1826-4159 [employer], [employer]. Care instructions adapted under license by [employer] (which disclaims liability or warranty for this information). If you have questions about a medical condition or this instruction, always ask your healthcare professional. [address] any warranty or liability for your use of this information.

## 2020-03-04 NOTE — PROGRESS NOTES
Cold Symptoms   The history is provided by the patient. This is a new problem. The current episode started more than 1 week ago. The problem occurs constantly. The cough is non-productive. Associated symptoms include chest pain, ear congestion, headaches, sore throat and shortness of breath. She has tried nothing for the symptoms. She is not a smoker. Her past medical history is significant for bronchitis. Her past medical history does not include asthma. Past Medical History:   Diagnosis Date    H/O craniotomy         Past Surgical History:   Procedure Laterality Date    HX ACL RECONSTRUCTION      HX OTHER SURGICAL      Brain Surgery/Craniotomy          Family History   Problem Relation Age of Onset    Cancer Father     Diabetes Maternal Grandmother     Depression Mother         Social History     Socioeconomic History    Marital status: SINGLE     Spouse name: Not on file    Number of children: Not on file    Years of education: Not on file    Highest education level: Not on file   Occupational History    Not on file   Social Needs    Financial resource strain: Not on file    Food insecurity:     Worry: Not on file     Inability: Not on file    Transportation needs:     Medical: Not on file     Non-medical: Not on file   Tobacco Use    Smoking status: Never Smoker    Smokeless tobacco: Never Used   Substance and Sexual Activity    Alcohol use: Yes     Comment: 1 x month, several beers     Drug use: No    Sexual activity: Yes     Partners: Male     Birth control/protection: I.U.D.    Lifestyle    Physical activity:     Days per week: Not on file     Minutes per session: Not on file    Stress: Not on file   Relationships    Social connections:     Talks on phone: Not on file     Gets together: Not on file     Attends Gnosticist service: Not on file     Active member of club or organization: Not on file     Attends meetings of clubs or organizations: Not on file     Relationship status: Not on file    Intimate partner violence:     Fear of current or ex partner: Not on file     Emotionally abused: Not on file     Physically abused: Not on file     Forced sexual activity: Not on file   Other Topics Concern    Not on file   Social History Narrative    Not on file                ALLERGIES: Patient has no known allergies. Review of Systems   HENT: Positive for congestion and sore throat. Respiratory: Positive for chest tightness and shortness of breath. Cardiovascular: Positive for chest pain. Neurological: Positive for headaches. All other systems reviewed and are negative. Vitals:    03/04/20 0856   BP: 130/86   Pulse: 60   Resp: 17   Temp: 98.9 °F (37.2 °C)   SpO2: 98%   Weight: 186 lb (84.4 kg)   Height: 5' 6\" (1.676 m)       Physical Exam  Vitals signs and nursing note reviewed. Constitutional:       General: She is not in acute distress. HENT:      Right Ear: Tympanic membrane and ear canal normal.      Left Ear: Tympanic membrane and ear canal normal.      Nose: Nose normal.      Mouth/Throat:      Pharynx: No oropharyngeal exudate or posterior oropharyngeal erythema. Eyes:      General:         Right eye: No discharge. Left eye: No discharge. Conjunctiva/sclera: Conjunctivae normal.   Neck:      Musculoskeletal: Neck supple. Pulmonary:      Effort: Pulmonary effort is normal. No respiratory distress. Breath sounds: Decreased breath sounds present. No wheezing, rhonchi or rales. Lymphadenopathy:      Cervical: No cervical adenopathy. Skin:     Findings: No rash. MDM    Procedures             ICD-10-CM ICD-9-CM    1. Viral URI with cough J06.9 465.9     B97.89     2.  Acute bronchospasm J98.01 519.11      Fluids/ gargles  Claritin/ allegra   Tylenol cold-sinus - max strength 1-2 tab 4 times/ day    with Advil as needed    If not better in 4-5 days - may use amox      Medications Ordered Today   Medications    predniSONE (DELTASONE) 20 mg tablet     Sig: Take 20 mg by mouth two (2) times a day for 5 days. With food     Dispense:  10 Tab     Refill:  0    amoxicillin (AMOXIL) 875 mg tablet     Sig: Take 1 Tab by mouth two (2) times a day for 10 days. Dispense:  20 Tab     Refill:  0     No results found for any visits on 03/04/20. The patients condition was discussed with the patient and they understand. The patient is to follow up with primary care doctor. If signs and symptoms become worse the pt is to go to the ER. The patient is to take medications as prescribed.

## 2020-07-16 ENCOUNTER — VIRTUAL VISIT (OUTPATIENT)
Dept: FAMILY MEDICINE CLINIC | Age: 30
End: 2020-07-16

## 2020-07-16 DIAGNOSIS — F41.9 ANXIETY: ICD-10-CM

## 2020-07-16 DIAGNOSIS — F41.0 PANIC ATTACKS: Primary | ICD-10-CM

## 2020-07-16 RX ORDER — BUPROPION HYDROCHLORIDE 150 MG/1
150 TABLET ORAL
Qty: 90 TAB | Refills: 0 | Status: SHIPPED | OUTPATIENT
Start: 2020-07-16 | End: 2020-11-09 | Stop reason: SDUPTHER

## 2020-07-16 RX ORDER — ALPRAZOLAM 0.25 MG/1
TABLET ORAL
Qty: 15 TAB | Refills: 0 | Status: SHIPPED | OUTPATIENT
Start: 2020-07-16 | End: 2020-12-17 | Stop reason: SDUPTHER

## 2020-07-16 RX ORDER — FLUOXETINE 10 MG/1
10 CAPSULE ORAL DAILY
Qty: 30 CAP | Refills: 1 | Status: SHIPPED | OUTPATIENT
Start: 2020-07-16 | End: 2020-08-04

## 2020-07-16 NOTE — PATIENT INSTRUCTIONS
1) Continue taking 150mg wellbtrin. Add: prozac 10mg daily. You should feel a difference in 1-2 weeks. If not, increase prozac to 20mg. If you feel worse after starting this medication, then stop the prozac and call office for appt. You can still use the propranolol 20mg for panic attacks. ADD: xanax 0.25mg as needed for panic attacks. This is a controlled substance and is physically addictive so it should be used sparingly. Keep this locked up, as it has street value      Clinical depression is a medical condition that goes beyond everyday sadness. Depression may cause serious, long-lasting symptoms and often disrupts a persons ability to perform routine tasks. The disorder is the most common psychiatric disorder worldwide. In the [place], about one in six people experiences a bout of clinical depression in their lifetime. You have been prescribed  prozac 10mg which is a selective serotonin reuptake inhibitor (SSRI) used to treat low mood (depression) and/or anxiety. Serotonin is a chemical messenger in the brain that affects mood, social behavior, appetite, sexual desire, memory and sleep. SSRIs block the reabsorption of serotonin in the brain, so there is more serotonin available. Please take this medication daily. Most people feel an effect within 1-2 weeks of starting the medication, but it can take up to 6-12 weeks to feel the full effect of the anti-depressant. Its important to keep in mind that each persons response to medication is different, so give it a few weeks to start working. Side effects may include drowsiness, insomnia, weight change, dry mouth. If you are having uncomfortable side effects, tell your healthcare provider. Some side effects go away in 2-3 weeks. It might be possible to find a dose or alternate medication that causes fewer side effects if they are still bothering you after 6 weeks of trying the medication.  Finding the right medication (or combination of medications) and the right doses can take some trial and error, so try not to get discouraged. Please find a therapist to help with ways to handle depression. Call your insurance company for referrals. Research shows that psychotherapy and antidepressants may be the best approach. Check with your insurance company to providers in the area. Anxiety is a common problem. It affects all kinds of people. There is no reason to feel embarrassed about getting treatment for anxiety. Whether you have occasional anxiety or a diagnosable disorder, there are steps you can take to help minimize and manage feeling of anxiety. Everyone feels anxious or nervous once in a while. That is normal. But being extremely anxious or worried on most days for 6 months or longer is not normal, and is considered a medical problem. This is called \"generalized anxiety disorder. \" The disorder can make it hard to do everyday tasks. Generalized anxiety disorder is just one anxiety disorder. There are others, such as panic disorder and phobias. Symptoms of extreme or severe anxiety:  People with extreme or severe anxiety feel very worried or \"on edge\" much of the time. They can have trouble sleeping or forget things. Plus, they can have physical symptoms. For instance, people with severe anxiety often feel very tired and have tense muscles. Some get stomach aches or feel chest \"tightness. \"    Steps you can do on your own to feel better:  Deep breathing exercises: Deep breathing triggers a relaxation response, helping to change from the \"fight-or-flight\" response anxiety brings on. Inhale slowly to a count of 4, starting at your belly and then moving into your chest.  Gently hold your breath for 4 counts, then slowly exhale to 4 counts. \"Clench\" exercise - clench various zones in your body for 30 seconds, then an overall body clench  Exercise can help many people feel less anxious.  Regular cardiovascular exercise (such as fast walking,) release endorphins - the \"feel good\" hormone in our body - and can reduce anxiety. Proper sleep:  Sleep is important to overall health. Not getting enough sleep can cause fatigue, inattention, and irritabililty, causing anxiety levels to increase. Healthy Diet: a healthy diet rich in whole grains, vegetables and fruits is healthier than simple carbohydrates found in processed foods. Skipping meals can cause blood sugar to drop and cause jittery feelings that could worsening underlying anxiety. It also a good idea to cut down on or stop drinking coffee and other sources of caffeine. Caffeine can make anxiety worse. Find \"Me\" time - it is important to take some time just to focus on you and help alleviate stress in daily life. This could be daily exercise, walking the dog, sitting in a quiet place without distraction, meditation, etc.     Medical treatments include:  ? Psychotherapy  Psychotherapy involves meeting with a mental health counselor to talk about your feelings, relationships, and worries. Therapy can help you find new ways of thinking about your situation so that you feel less anxious. In therapy, you might also learn new skills to reduce anxiety. Please call your insurance company to get a list of therapists that will be covered and make an appointment. Alternatively, you can go to Psychology Today's website to find a counselor. · Go to www. [employer]  · Enter your zip code. · Click on your insurance carrier (usually on left side of screen). · Then click any other parameters you desire. This will result in a list of providers. Click on any provider to learn more about them or see the contact information. Please choose a provider, call them, and schedule an appointment. ?Medicines  Medicines used to treat depression can relieve anxiety, too, even in people who are not depressed. Some people have psychotherapy and take medicines at the same time.     Phone apps that can help with anxiety:  CALM - Use the principles of mindfulness and meditation to ease your mind and keep anxiety at bay. The serene interface is just the beginning. Once it opens, there are relaxing sounds and sleep stories. Enjoy guided meditations at various lengths to help with everything from building self-esteem to calming anxiety. SIMPLE HABIT - guided meditation for anxiety relief    JOIE - Self-help for Anxiety Management - range of self-help methods. Helps you understand what causes your anxiety, monitor your anxious thoughts and behavior over time and manage your anxiety through self-help exercises and private reflection. BOOSTERBUDDY   This mark offers a novel way for teens and young adults to improve their resilience and work toward being healthier both physically and emotionally. 7 CUPS  7 Cups uses trained, volunteer, active listeners to provide free, anonymous, and confidential emotional support to people needing help coping with acute stressors and long-term mental health issues. Yanira Burnett 82 apps provide people with quick resources for tracking their cognitive distortions, activities, and safety plan in case of an emergency. SLEEPBOT   SleepBot is a quick and simple way for people to log their sleep and improve their sleep hygiene. WHATS UP? [facility] MARK   The [place] Up? mark helps people monitor their mood and uses principles of CBT and acceptance commitment therapy (ACT) to help people reframe their thoughts and cognitive distortions. DGCBZH  - emotional health assistant. The mark employs a mood tracker, a chat interface, and guided mindfulness and learns from each user with AI to deliver the most effective support available. [facility] mark that allows you to speak to a therapist without leaving home, connecting you to 8758M of licensed therapists with medical training.     Other apps: Gilda Moodpath, Dutton, Headspace, Way of Life, Grateful, Stigma, Rudy  Quit That!  - mark for addictions    Keep the numbers for these national suicide hotlines: 9-590-174-TALK (7-129.776.5131) and 4-147-PRZAOPP (7-267.205.7808). If you or someone you know talks about suicide or feeling hopeless, get help right away. [address] [place] [address] St: [phone]  Crisis: [address]: [phone]  Crisis: [address]: [phone]  Crisis: [phone]         Panic Attacks: Care Instructions  Your Care Instructions     During a panic attack, you may have a feeling of intense fear or terror, trouble breathing, chest pain or tightness, heartbeat changes, dizziness, sweating, and shaking. A panic attack starts suddenly and usually lasts from 5 to 20 minutes but may last even longer. You have the most anxiety about 10 minutes after the attack starts. An attack can begin with a stressful event, or it can happen without a cause. Although panic attacks can cause scary symptoms, you can learn to manage them with self-care, counseling, and medicine. Follow-up care is a key part of your treatment and safety. Be sure to make and go to all appointments, and call your doctor if you are having problems. It's also a good idea to know your test results and keep a list of the medicines you take. How can you care for yourself at home? · Take your medicine exactly as directed. Call your doctor if you think you are having a problem with your medicine. · Go to your counseling sessions and follow-up appointments. · Recognize and accept your anxiety. Then, when you are in a situation that makes you anxious, say to yourself, \"This is not an emergency. I feel uncomfortable, but I am not in danger. I can keep going even if I feel anxious. \"  · Be kind to your body:  ? Relieve tension with exercise or a massage. ? Get enough rest.  ? Avoid alcohol, caffeine, nicotine, and illegal drugs. They can increase your anxiety level, cause sleep problems, or trigger a panic attack. ? Learn and do relaxation techniques. See below for more about these techniques. · Engage your mind. Get out and do something you enjoy. Go to a funny movie, or take a walk or hike. Plan your day. Having too much or too little to do can make you anxious. · Keep a record of your symptoms. Discuss your fears with a good friend or family member, or join a support group for people with similar problems. Talking to others sometimes relieves stress. · Get involved in social groups, or volunteer to help others. Being alone sometimes makes things seem worse than they are. · Get at least 30 minutes of exercise on most days of the week to relieve stress. Walking is a good choice. You also may want to do other activities, such as running, swimming, cycling, or playing tennis or team sports. Relaxation techniques  Do relaxation exercises for 10 to 20 minutes a day. You can play soothing, relaxing music while you do them, if you wish. · Tell others in your house that you are going to do your relaxation exercises. Ask them not to disturb you. · Find a comfortable place, away from all distractions and noise. · Lie down on your back, or sit with your back straight. · Focus on your breathing. Make it slow and steady. · Breathe in through your nose. Breathe out through either your nose or mouth. · Breathe deeply, filling up the area between your navel and your rib cage. Breathe so that your belly goes up and down. · Do not hold your breath. · Breathe like this for 5 to 10 minutes. Notice the feeling of calmness throughout your whole body. As you continue to breathe slowly and deeply, relax by doing the following for another 5 to 10 minutes:  · Tighten and relax each muscle group in your body.  You can begin at your toes and work your way up to your head.  · Imagine your muscle groups relaxing and becoming heavy. · Empty your mind of all thoughts. · Let yourself relax more and more deeply. · Become aware of the state of calmness that surrounds you. · When your relaxation time is over, you can bring yourself back to alertness by moving your fingers and toes and then your hands and feet and then stretching and moving your entire body. Sometimes people fall asleep during relaxation, but they usually wake up shortly afterward. · Always give yourself time to return to full alertness before you drive a car or do anything that might cause an accident if you are not fully alert. Never play a relaxation tape while driving a car.

## 2020-07-16 NOTE — PROGRESS NOTES
Susan Mayers is a 30 y.o. female    HIPAA verified by two patient identifiers. Chief Complaint   Patient presents with    Panic Attack     Anxiety     Medication Check     Wellbutrin XL is not working      1. Have you been to the ER, urgent care clinic since your last visit? Hospitalized since your last visit? No    2. Have you seen or consulted any other health care providers outside of the [address] [place] [address] since your last visit? Include any pap smears or colon screening.  No    DOXY: [phone]

## 2020-07-16 NOTE — PROGRESS NOTES
S: Tiffanie Becerra is a 30 y.o. female who presents for depression/anxiety follow up    Assessment/Plan: Virtual Visit (provider couldn't hear pt, but could see on video and talked it on phone)     1. Anxiety,  Panic attacks  -current therapy: wellbutrin 150mg (not 300mg) + propranolol 20mg prn for panic attacks   -PHQ = 19, CHEVY = 19 (P = 21, CHEVY = 20 @OV 2/11/20; P = 4, CHEVY = 1 @OV 4/12/19; P = 6 @OV  9/2018)  -ADD: prozac 10mg   -ADD: xanax 02.5mg prn for panic attacks  -REFERRAL TO BEHAVIOR HEALTH -increase in panic attacks/mood swings; father w/bipolar    21 or more minutes were spent on the digital evaluation and management of this patient. RTC 4 weeks for dep/med check     HPI:  Current therapy: wellbutrin 150mg (not 300mg) + propranolol 20mg prn for panic attacks   + anxiety  + excessive fatigue  + panic attacks - had to leave work today - had one on 7/9/20 (nothing bothering her then)   Has had day episodes of sweating, problem breathing - took propranolol and helped but keep having attacks  Better sleep - going to bed around 8pm bc feels so drained   + crying spells   No delusions, hallucinations or SI/HI  Not going to counseling, plans to f/u when insurance kicks in   Trying to exercise more often  Seems to cycle between happy and sad with boyfriend relationship   journalling emotions  Dad with hx of bipolar - pt doesn't have dx, but fearful she may - discussed seeing psych to get assessed   Still having concentration issues - was on concerta as a kid  Advised to f/up with neuropsych appt    No suicidal ideation. No homicidal ideation. PHQ-9 Score: 19  Over the past 2 weeks, how often have you been bothered by any of the following problems?  (Not at all = 0; several days = 1; More than 1/2 the days = 2; nearly every day = 3)  1) Little interest or pleasure in doing things:  2  2) Feeling down, depressed or hopeless:3  3) Trouble falling asleep, staying asleep or sleeping too much:1  4) Feeling tired or having little energy:2  5) Poor appetite or overeating:3  6) Feeling bad about yourself - or that you're a failure or have let yourself or your family down:3  7) Trouble concentrating on things, such as reading the newspaper or watching TV: 3  8) Moving or speaking so slowly that other people could have noticed. Or, the opposite - being so fidgety or restless that you have been moving around a lot more than usual:2  9) Thoughts that you would be better off dead or of hurting yourself in some way:0    GAD7 score: 19  Feeling nervous, anxious or on edge:   3  Not being able to stop or control worrying:3  Worrying too much about different things:3  Trouble relaxing:  3  Being so restless that it is hard to sit still:  2  Becoming easily annoyed or irritable:  3  Feeling afraid as if something awful might happen: 0  If any of the above were scored more than 0, how difficult have these problems made it for you to do your work, take care of things at home, or get along with other people? Very difficult          Social History:  Physical: increased exercise  Social: lives with boyfriend and his 10yo son Swati Eye)  Occupation: peds clinic -      Social History     Tobacco Use   Smoking Status Never Smoker   Smokeless Tobacco Never Used     Social History     Substance and Sexual Activity   Alcohol Use Yes    Comment: 1 x month, several beers      Social History     Substance and Sexual Activity   Drug Use No         checked: Reviewed patient's prescription monitoring report at time of visit and there are no discrepancies.      Review of Systems:  - Constitutional Symptoms: no fevers, chills, weight loss  - Cardiovascular:  no palpitations, chest pain  - Respiratory: no cough, + shortness of breath    I reviewed the following:  Past Medical History:   Diagnosis Date    H/O craniotomy        Current Outpatient Medications   Medication Sig Dispense Refill    buPROPion XL (WELLBUTRIN XL) 150 mg tablet TAKE 2 TABLETS BY MOUTH EVERY DAY IN THE MORNING 60 Tab 0    propranolol (INDERAL) 20 mg tablet Take 1 Tab by mouth daily as needed (palpitation/ chest pain). 30 Tab 0       No Known Allergies    O: VS: There were no vitals taken for this visit. GENERAL: Migue Watts is in no acute distress. Non-toxic. Well nourished. Well developed. Appropriately groomed. PSYCH: appropriate behavior, dress and thought processes. Good eye contact. Clear and coherent speech. Full affect. Good insight.   ____________________________________________________________________  Patient education was done. Advised on nutrition, physical activity, weight management, tobacco, alcohol and safety, including suicide hotline. Counseling included discussion of diagnosis, differentials, treatment options, prescribed treatment, warning signs and follow up. Medication risks/benefits, costs interactions and alternatives discussed with patient.       Patient agreed to plan of care and verbalized understanding. Patient was given an after visit summary which included current diagnoses, medications and vital signs. Pt agrees to follow up in 4 weeks. Migue Watts, who was evaluated through a patient-initiated, synchronous (real-time) audio-video encounter, and/or her healthcare decision maker, is aware that it is a billable service, with coverage as determined by her insurance carrier. She provided verbal consent to proceed: Yes, and patient identification was verified. It was conducted pursuant to the emergency declaration under the [address] [place] [address], [address] [place] [address] authority and the [employer] and [employer] Act. A caregiver was present when appropriate. Ability to conduct physical exam was limited. I was in the office. The patient was at home.       Flora Michael NP

## 2020-08-04 RX ORDER — FLUOXETINE 10 MG/1
20 CAPSULE ORAL DAILY
Qty: 60 CAP | Refills: 0 | Status: SHIPPED | OUTPATIENT
Start: 2020-08-04 | End: 2020-08-25

## 2020-11-09 RX ORDER — BUPROPION HYDROCHLORIDE 150 MG/1
150 TABLET ORAL
Qty: 90 TAB | Refills: 0 | Status: SHIPPED | OUTPATIENT
Start: 2020-11-09 | End: 2020-12-01

## 2020-11-09 NOTE — TELEPHONE ENCOUNTER
PCP: Jay Piña MD    Last appt: Visit date not found  No future appointments. Requested Prescriptions     Pending Prescriptions Disp Refills    buPROPion XL (WELLBUTRIN XL) 150 mg tablet 90 Tab 0     Sig: Take 1 Tab by mouth every morning.        Prior labs and Blood pressures:  BP Readings from Last 3 Encounters:   03/04/20 130/86   02/11/20 121/76   08/07/19 127/61     Lab Results   Component Value Date/Time    Sodium 140 12/14/2016 07:54 AM    Potassium 5.1 12/14/2016 07:54 AM    Chloride 101 12/14/2016 07:54 AM    CO2 26 12/14/2016 07:54 AM    Glucose 87 12/14/2016 07:54 AM    BUN 16 12/14/2016 07:54 AM    Creatinine 0.77 12/14/2016 07:54 AM    BUN/Creatinine ratio 21 (H) 12/14/2016 07:54 AM    GFR est  12/14/2016 07:54 AM    GFR est non- 12/14/2016 07:54 AM    Calcium 10.2 12/14/2016 07:54 AM     No results found for: HBA1C, HGBE8, TYH3QGCA, ORG1BKZE  Lab Results   Component Value Date/Time    Cholesterol, total 211 (H) 10/11/2016 08:23 AM    HDL Cholesterol 90 10/11/2016 08:23 AM    LDL, calculated 103 (H) 10/11/2016 08:23 AM    VLDL, calculated 18 10/11/2016 08:23 AM    Triglyceride 89 10/11/2016 08:23 AM     No results found for: SHELLIE Martínez    Lab Results   Component Value Date/Time    TSH 1.200 12/14/2016 07:54 AM

## 2020-11-12 ENCOUNTER — TELEPHONE (OUTPATIENT)
Dept: FAMILY MEDICINE CLINIC | Age: 30
End: 2020-11-12

## 2020-11-12 NOTE — TELEPHONE ENCOUNTER
Patient stated that she called this morning and spoke to staff in office. No message was routed to clinical staff in regards to phone call this morning. Patient states that she has been experiencing arm tingling, numbness, and chest pains. Patient stated that she felt like heart beat was racing. Patient informed that there was no availability in office until the last week of November/ first week of December. Informed patient that with the symptoms she presents with, it would be recommended that she be evaluated at her nearest Urgent [facility] or Emergency Room. Patient stated that she understood the information provided to her today. Patient had no further questions or concerns at this time.

## 2020-11-25 ENCOUNTER — TELEPHONE (OUTPATIENT)
Dept: FAMILY MEDICINE CLINIC | Age: 30
End: 2020-11-25

## 2020-11-25 NOTE — TELEPHONE ENCOUNTER
Lacey Buckley called into the pharmacy to check what was going on with patient , [place] stated that a new script is needed to be sent in saying 2 tabs a day and insurance will be covered. As far as the [facility] pharmacy stated no more refills, patient needs and ov/labs before you refilling, correct?

## 2020-11-25 NOTE — TELEPHONE ENCOUNTER
Patient called into the office stating that she only have one more day left for her wellbutrin and possibly out of her prozac, unclear because patient was frantic babbling. I made patient aware that the rx has been sent over to the pharmacy on the 9th of November for 90 tabs so unclear on how she could be out, patient stated that on her last visit with you that you changed her instructions to take 2 pills a day, still not registering how she could possibly be out. PSR told patient that I will send a message to [facility] in regards to her wellbutrin bc of dosage change, but labs or ov is needed before any other refills. Patient then proceeds to say that she picked up the wellbutrin last month or so, she wasn't exactly sure when she done so. Made her aware again that medication was sent over on the 9th of November, she stated that when she called the pharmacy she don't have any refills or any rx to .

## 2020-11-26 RX ORDER — FLUOXETINE 10 MG/1
CAPSULE ORAL
Qty: 30 CAP | Refills: 0 | Status: SHIPPED | OUTPATIENT
Start: 2020-11-26 | End: 2020-12-17 | Stop reason: SDUPTHER

## 2020-12-02 RX ORDER — BUPROPION HYDROCHLORIDE 150 MG/1
TABLET ORAL
Qty: 60 TAB | Refills: 0 | OUTPATIENT
Start: 2020-12-02

## 2020-12-02 NOTE — TELEPHONE ENCOUNTER
Requested Prescriptions     Pending Prescriptions Disp Refills    buPROPion XL (WELLBUTRIN XL) 150 mg tablet 60 Tab 0       I called Pharmacy and they stated that insurance needs a new script written for 300 mg once a day so they can cover this.

## 2020-12-04 RX ORDER — BUPROPION HYDROCHLORIDE 300 MG/1
300 TABLET ORAL
Qty: 90 TAB | Refills: 0 | Status: SHIPPED | OUTPATIENT
Start: 2020-12-04 | End: 2020-12-17

## 2020-12-17 ENCOUNTER — VIRTUAL VISIT (OUTPATIENT)
Dept: FAMILY MEDICINE CLINIC | Age: 30
End: 2020-12-17
Payer: COMMERCIAL

## 2020-12-17 DIAGNOSIS — F33.1 MODERATE EPISODE OF RECURRENT MAJOR DEPRESSIVE DISORDER (HCC): ICD-10-CM

## 2020-12-17 DIAGNOSIS — F41.9 ANXIETY: ICD-10-CM

## 2020-12-17 DIAGNOSIS — F90.9 ATTENTION DEFICIT HYPERACTIVITY DISORDER (ADHD), UNSPECIFIED ADHD TYPE: Primary | ICD-10-CM

## 2020-12-17 DIAGNOSIS — F41.0 PANIC ATTACKS: ICD-10-CM

## 2020-12-17 PROCEDURE — 99213 OFFICE O/P EST LOW 20 MIN: CPT | Performed by: NURSE PRACTITIONER

## 2020-12-17 RX ORDER — ALPRAZOLAM 0.25 MG/1
TABLET ORAL
Qty: 15 TAB | Refills: 0 | Status: SHIPPED | OUTPATIENT
Start: 2020-12-17

## 2020-12-17 RX ORDER — BUPROPION HYDROCHLORIDE 300 MG/1
300 TABLET ORAL
Qty: 90 TAB | Refills: 1 | Status: SHIPPED | OUTPATIENT
Start: 2020-12-17

## 2020-12-17 RX ORDER — FLUOXETINE 10 MG/1
CAPSULE ORAL
Qty: 90 CAP | Refills: 1 | Status: SHIPPED | OUTPATIENT
Start: 2020-12-17

## 2020-12-17 NOTE — PATIENT INSTRUCTIONS
1) Will continue with wellbutrin 300mg + fluoxetine 10mg daily. You report anxiety and focus issues in afternoon. Can try to take an additional 150mg wellbturin (or 75mg wellbutin, if you cut 150mg tab in half) to see if this helps with symptoms You need to make an appt with neuropsych (Dr. Iggy Zazueta) for further evaluation of ADD and he can also prescribe alternative therapy for anxiety in conjunction with ADD meds. Anxiety is a common problem. It affects all kinds of people. There is no reason to feel embarrassed about getting treatment for anxiety. Whether you have occasional anxiety or a diagnosable disorder, there are steps you can take to help minimize and manage feeling of anxiety. Everyone feels anxious or nervous once in a while. That is normal. But being extremely anxious or worried on most days for 6 months or longer is not normal, and is considered a medical problem. This is called \"generalized anxiety disorder. \" The disorder can make it hard to do everyday tasks. Generalized anxiety disorder is just one anxiety disorder. There are others, such as panic disorder and phobias. Symptoms of extreme or severe anxiety: 
People with extreme or severe anxiety feel very worried or \"on edge\" much of the time. They can have trouble sleeping or forget things. Plus, they can have physical symptoms. For instance, people with severe anxiety often feel very tired and have tense muscles. Some get stomach aches or feel chest \"tightness. \" 
 
Steps you can do on your own to feel better: 
Deep breathing exercises: Deep breathing triggers a relaxation response, helping to change from the \"fight-or-flight\" response anxiety brings on. Inhale slowly to a count of 4, starting at your belly and then moving into your chest.  Gently hold your breath for 4 counts, then slowly exhale to 4 counts. \"Clench\" exercise - clench various zones in your body for 30 seconds, then an overall body clench Exercise can help many people feel less anxious. Regular cardiovascular exercise (such as fast walking,) release endorphins - the \"feel good\" hormone in our body - and can reduce anxiety. Proper sleep:  Sleep is important to overall health. Not getting enough sleep can cause fatigue, inattention, and irritabililty, causing anxiety levels to increase. Healthy Diet: a healthy diet rich in whole grains, vegetables and fruits is healthier than simple carbohydrates found in processed foods. Skipping meals can cause blood sugar to drop and cause jittery feelings that could worsening underlying anxiety. It also a good idea to cut down on or stop drinking coffee and other sources of caffeine. Caffeine can make anxiety worse. Find \"Me\" time - it is important to take some time just to focus on you and help alleviate stress in daily life. This could be daily exercise, walking the dog, sitting in a quiet place without distraction, meditation, etc.  
 
Medical treatments include: ? Psychotherapy  Psychotherapy involves meeting with a mental health counselor to talk about your feelings, relationships, and worries. Therapy can help you find new ways of thinking about your situation so that you feel less anxious. In therapy, you might also learn new skills to reduce anxiety. Please call your insurance company to get a list of therapists that will be covered and make an appointment. Alternatively, you can go to Psychology Today's website to find a counselor. · Go to www. [employer]. [employer] 
· Enter your zip code. · Click on your insurance carrier (usually on left side of screen). · Then click any other parameters you desire. This will result in a list of providers. Click on any provider to learn more about them or see the contact information. Please choose a provider, call them, and schedule an appointment. ?Medicines  Medicines used to treat depression can relieve anxiety, too, even in people who are not depressed.  Some people have psychotherapy and take medicines at the same time. Phone apps that can help with anxiety: 
CALM - Use the principles of mindfulness and meditation to ease your mind and keep anxiety at [address]. The serene interface is just the beginning. Once it opens, there are relaxing sounds and sleep stories. Enjoy guided meditations at various lengths to help with everything from building self-esteem to calming anxiety. SIMPLE HABIT - guided meditation for anxiety relief JOIE - Self-help for Anxiety Management - range of self-help methods. Helps you understand what causes your anxiety, monitor your anxious thoughts and behavior over time and manage your anxiety through self-help exercises and private reflection. BOOSTERBUDDY This mark offers a novel way for teens and young adults to improve their resilience and work toward being healthier both physically and emotionally. 7 CUPS 
7 Cups uses trained, volunteer, active listeners to provide free, anonymous, and confidential emotional support to people needing help coping with acute stressors and long-term mental health issues. Jõe 23 The MoodTools and FearTools apps provide people with quick resources for tracking their cognitive distortions, activities, and safety plan in case of an emergency. SLEEPBOT SleepBot is a quick and simple way for people to log their sleep and improve their sleep hygiene. [address] UP? SELECT [facility] MARK The [facility] Up? mark helps people monitor their mood and uses principles of CBT and acceptance commitment therapy (ACT) to help people reframe their thoughts and cognitive distortions. HJADFU  - emotional health assistant. The mark employs a mood tracker, a chat interface, and guided mindfulness and learns from each user with AI to deliver the most effective support available.  
 
[facility] mark that allows you to speak to a therapist without leaving home, connecting you to 8848B of licensed therapists with medical training. Other apps: Gilda, [address] [facility], [place], Shakila fernandez, [employer], [address] [place] [address], Rudy Quit That!  - mark for addictions Keep the numbers for these national suicide hotlines: 0-936-913-TALK (9-188.331.1375) and 0-075-IZEYQWH (6-573.662.6511). If you or someone you know talks about suicide or feeling hopeless, get help right away. [facility] [address] Clinic: [phone] Crisis: [phone] 3[facility]: [phone] Crisis: [phone] 2135 [facility]: [phone] Crisis: [phone] [facility] [phone] 
 
2) I have resigned from [employer], so you will need to find a new PCP. Thank you for allowing me to be part of your healthcare team.  It has been a pleasure! There are 3 other medical doctors (MDs) and a nurse practitioner (NP) who are accepting new patients at offices close to [place].  ([address]  providers can access your medical records from [place].) [facility]:  
Dr. Chetna Rosenthal (Internal Medicine) ph: [phone] Latasha De Paz NP (Internal Medicine) ph: [phone] [facility] Dr. Buck Tucker (Family Medicine) ph: [phone] [facility] Dr. Bud Mosquera (Family Medicine) ph: [phone] Anxiety Disorder: Care Instructions Your Care Instructions Anxiety is a normal reaction to stress. Difficult situations can cause you to have symptoms such as sweaty palms and a nervous feeling. In an anxiety disorder, the symptoms are far more severe. Constant worry, muscle tension, trouble sleeping, nausea and diarrhea, and other symptoms can make normal daily activities difficult or impossible. These symptoms may occur for no reason, and they can affect your work, school, or social life.  Medicines, counseling, and self-care can all help. Follow-up care is a key part of your treatment and safety. Be sure to make and go to all appointments, and call your doctor if you are having problems. It's also a good idea to know your test results and keep a list of the medicines you take. How can you care for yourself at home? · Take medicines exactly as directed. Call your doctor if you think you are having a problem with your medicine. · Go to your counseling sessions and follow-up appointments. · Recognize and accept your anxiety. Then, when you are in a situation that makes you anxious, say to yourself, \"This is not an emergency. I feel uncomfortable, but I am not in danger. I can keep going even if I feel anxious. \" · Be kind to your body: 
? Relieve tension with exercise or a massage. ? Get enough rest. 
? Avoid alcohol, caffeine, nicotine, and illegal drugs. They can increase your anxiety level and cause sleep problems. ? Learn and do relaxation techniques. See below for more about these techniques. · Engage your mind. Get out and do something you enjoy. Go to a [employer] [place] movie, or take a walk or hike. Plan your day. Having too much or too little to do can make you anxious. · Keep a record of your symptoms. Discuss your fears with a good friend or family member, or join a support group for people with similar problems. Talking to others sometimes relieves stress. · Get involved in social groups, or volunteer to help others. Being alone sometimes makes things seem worse than they are. · Get at least 30 minutes of exercise on most days of the week to relieve stress. Walking is a good choice. You also may want to do other activities, such as running, swimming, cycling, or playing tennis or team sports. Relaxation techniques Do relaxation exercises 10 to 20 minutes a day. You can play soothing, relaxing music while you do them, if you wish. · Tell others in your house that you are going to do your relaxation exercises. Ask them not to disturb you. · Find a comfortable place, away from all distractions and noise. · Lie down on your back, or sit with your back straight. · Focus on your breathing. Make it slow and steady. · Breathe in through your nose. Breathe out through either your nose or mouth. · Breathe deeply, filling up the area between your navel and your rib cage. Breathe so that your belly goes up and down. · Do not hold your breath. · Breathe like this for 5 to 10 minutes. Notice the feeling of calmness throughout your whole body. As you continue to breathe slowly and deeply, relax by doing the following for another 5 to 10 minutes: · Tighten and relax each muscle group in your body. You can begin at your toes and work your way up to your head. · Imagine your muscle groups relaxing and becoming heavy. · Empty your mind of all thoughts. · Let yourself relax more and more deeply. · Become aware of the state of calmness that surrounds you. · When your relaxation time is over, you can bring yourself back to alertness by moving your fingers and toes and then your hands and feet and then stretching and moving your entire body. Sometimes people fall asleep during relaxation, but they usually wake up shortly afterward. · Always give yourself time to return to full alertness before you drive a car or do anything that might cause an accident if you are not fully alert. Never play a relaxation tape while you drive a car. When should you call for help? Call 911 anytime you think you may need emergency care. For example, call if: 
  · You feel you cannot stop from hurting yourself or someone else. Keep the numbers for these national suicide hotlines: 4-220-711-TALK (5-263.762.7335) and 9-641-JALQNUQ (1-479.324.4028). If you or someone you know talks about suicide or feeling hopeless, get help right away.  
Watch closely for changes in your health, and be sure to contact your doctor if: 
  · You have anxiety or fear that affects your life.  
  · You have symptoms of anxiety that are new or different from those you had before. Where can you learn more? Go to http://www.Oxsensis.com/ Enter P754 in the search box to learn more about \"Anxiety Disorder: Care Instructions. \" Current as of: January 31, 2020               Content Version: 12.6 © 8995-3587 [employer]. Care instructions adapted under license by [employer] (which disclaims liability or warranty for this information). If you have questions about a medical condition or this instruction, always ask your healthcare professional. [place] [address] any warranty or liability for your use of this information.
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
## 2022-01-03 ENCOUNTER — OFFICE VISIT (OUTPATIENT)
Dept: URGENT CARE | Age: 32
End: 2022-01-03
Payer: COMMERCIAL

## 2022-01-03 VITALS — OXYGEN SATURATION: 100 % | TEMPERATURE: 98.9 F | HEART RATE: 69 BPM | RESPIRATION RATE: 17 BRPM

## 2022-01-03 DIAGNOSIS — R68.89 FLU-LIKE SYMPTOMS: ICD-10-CM

## 2022-01-03 DIAGNOSIS — Z20.822 SUSPECTED COVID-19 VIRUS INFECTION: Primary | ICD-10-CM

## 2022-01-03 LAB
FLUAV+FLUBV AG NOSE QL IA.RAPID: NEGATIVE
FLUAV+FLUBV AG NOSE QL IA.RAPID: NEGATIVE
SARS-COV-2 POC: POSITIVE
VALID INTERNAL CONTROL?: YES

## 2022-01-03 PROCEDURE — 99213 OFFICE O/P EST LOW 20 MIN: CPT | Performed by: FAMILY MEDICINE

## 2022-01-03 PROCEDURE — 87426 SARSCOV CORONAVIRUS AG IA: CPT | Performed by: FAMILY MEDICINE

## 2022-01-03 PROCEDURE — 87804 INFLUENZA ASSAY W/OPTIC: CPT | Performed by: FAMILY MEDICINE

## 2022-01-03 NOTE — LETTER
[address] [facility]  [address]  [address] [place] [id number]    Work/School Note    Date: 1/3/2022    To Whom It May concern:    Alexandro Griffin was seen and treated today in the urgent care center by the following provider(s):  No providers found. Alexandro Griffin may return to work on 1/10/22.     Sincerely,          Art Christian MD

## 2022-01-03 NOTE — PROGRESS NOTES
This patient was seen at [address] [place] [address] Urgent Care while in their vehicle due to COVID-19 pandemic with PPE and focused examination in order to decrease community viral transmission. The patient/guardian gave verbal consent to treat. She is been vaccinated for covid and flu       Cold Symptoms  The history is provided by the patient. This is a new problem. The current episode started 2 days ago. The problem occurs every few minutes. The problem has not changed since onset. The cough is non-productive. There has been no fever. Associated symptoms include chills, sweats, headaches, rhinorrhea, sore throat and myalgias. Pertinent negatives include no shortness of breath, no wheezing and no nausea. She has tried nothing for the symptoms. Risk factors: no known exposure to covid  She is not a smoker. Her past medical history does not include pneumonia or asthma (h/o RAD ). Past Medical History:   Diagnosis Date    H/O craniotomy         Past Surgical History:   Procedure Laterality Date    HX ACL RECONSTRUCTION      HX OTHER SURGICAL      Brain Surgery/Craniotomy          Family History   Problem Relation Age of Onset    Cancer Father     Diabetes Maternal Grandmother     Depression Mother         Social History     Socioeconomic History    Marital status: SINGLE     Spouse name: Not on file    Number of children: Not on file    Years of education: Not on file    Highest education level: Not on file   Occupational History    Not on file   Tobacco Use    Smoking status: Never Smoker    Smokeless tobacco: Never Used   Substance and Sexual Activity    Alcohol use: Yes     Comment: 1 x month, several beers     Drug use: No    Sexual activity: Yes     Partners: Male     Birth control/protection: I.U.D.    Other Topics Concern    Not on file   Social History Narrative    Not on file     Social Determinants of Health     Financial Resource Strain:     Difficulty of Paying Living Expenses: Not on file   Food Insecurity:     Worried About [address] [employer] in the Last Year: Not on file    Erasto of Food in the Last Year: Not on file   Transportation Needs:     Lack of Transportation (Medical): Not on file    Lack of Transportation (Non-Medical): Not on file   Physical Activity:     Days of Exercise per Week: Not on file    Minutes of Exercise per Session: Not on file   Stress:     Feeling of Stress : Not on file   Social Connections:     Frequency of Communication with Friends and Family: Not on file    Frequency of Social Gatherings with Friends and Family: Not on file    Attends Restorationism Services: Not on file    Active Member of [address] [place] [address] or Organizations: Not on file    Attends Club or Organization Meetings: Not on file    Marital Status: Not on file   Intimate Partner Violence:     Fear of Current or Ex-Partner: Not on file    Emotionally Abused: Not on file    Physically Abused: Not on file    Sexually Abused: Not on file   Housing Stability:     Unable to Pay for Housing in the Last Year: Not on file    Number of [place] in the Last Year: Not on file    Unstable Housing in the Last Year: Not on file                ALLERGIES: Patient has no known allergies. Review of Systems   Constitutional: Positive for chills. Negative for fever. HENT: Positive for congestion, rhinorrhea and sore throat. Respiratory: Positive for cough. Negative for shortness of breath and wheezing. Gastrointestinal: Negative for nausea. Musculoskeletal: Positive for myalgias. Neurological: Positive for headaches. All other systems reviewed and are negative. Vitals:    01/03/22 0903   Pulse: 69   Resp: 17   Temp: 98.9 °F (37.2 °C)   SpO2: 100%       Physical Exam  Vitals and nursing note reviewed. Constitutional:       General: She is not in acute distress. Appearance: She is not ill-appearing. Pulmonary:      Effort: Pulmonary effort is normal. No respiratory distress. Breath sounds: No wheezing. MDM    Procedures             ICD-10-CM ICD-9-CM    1. Suspected COVID-19 virus infection  Z20.822 V01.79 AMB POC SARS-COV-2   2. Flu-like symptoms  R68.89 780.99 AMB POC CHRISTIE INFLUENZA A/B TEST     No orders of the defined types were placed in this encounter. Results for orders placed or performed in visit on 01/03/22   AMB POC SARS-COV-2   Result Value Ref Range    SARS-COV-2 POC Positive (A) Negative   AMB POC CHRISTIE INFLUENZA A/B TEST   Result Value Ref Range    VALID INTERNAL CONTROL POC Yes     Influenza A Ag POC Negative Negative    Influenza B Ag POC Negative Negative     The patients condition was discussed with the patient and they understand. The patient is to follow up with primary care doctor. If signs and symptoms become worse the pt is to go to the ER. The patient is to take medications as prescribed.

## 2022-03-19 PROBLEM — F41.9 ANXIETY: Status: ACTIVE | Noted: 2019-04-12

## 2022-03-19 PROBLEM — F33.9 EPISODE OF RECURRENT MAJOR DEPRESSIVE DISORDER (HCC): Status: ACTIVE | Noted: 2019-04-12

## 2023-05-18 RX ORDER — FLUOXETINE 10 MG/1
1 CAPSULE ORAL DAILY
COMMUNITY
Start: 2020-12-17

## 2023-05-18 RX ORDER — ALPRAZOLAM 0.25 MG/1
TABLET ORAL
COMMUNITY
Start: 2020-12-17

## 2023-05-18 RX ORDER — BUPROPION HYDROCHLORIDE 300 MG/1
300 TABLET ORAL
COMMUNITY
Start: 2020-12-17

## 2023-05-18 RX ORDER — PROPRANOLOL HYDROCHLORIDE 20 MG/1
20 TABLET ORAL DAILY PRN
COMMUNITY
Start: 2020-02-11

## 2023-06-06 ENCOUNTER — HOSPITAL ENCOUNTER (EMERGENCY)
Facility: HOSPITAL | Age: 33
Discharge: HOME | End: 2023-06-06
Attending: EMERGENCY MEDICINE
Payer: COMMERCIAL

## 2023-06-06 ENCOUNTER — APPOINTMENT (OUTPATIENT)
Facility: HOSPITAL | Age: 33
End: 2023-06-06
Payer: COMMERCIAL

## 2023-06-06 VITALS
RESPIRATION RATE: 14 BRPM | HEART RATE: 72 BPM | DIASTOLIC BLOOD PRESSURE: 76 MMHG | TEMPERATURE: 98 F | WEIGHT: 171.52 LBS | SYSTOLIC BLOOD PRESSURE: 123 MMHG | OXYGEN SATURATION: 99 %

## 2023-06-06 DIAGNOSIS — M25.511 ACUTE PAIN OF RIGHT SHOULDER: ICD-10-CM

## 2023-06-06 DIAGNOSIS — S20.219A CONTUSION OF CHEST WALL, UNSPECIFIED LATERALITY, INITIAL ENCOUNTER: ICD-10-CM

## 2023-06-06 DIAGNOSIS — S00.83XA FACIAL CONTUSION, INITIAL ENCOUNTER: Primary | ICD-10-CM

## 2023-06-06 DIAGNOSIS — S09.90XA CLOSED HEAD INJURY, INITIAL ENCOUNTER: ICD-10-CM

## 2023-06-06 LAB — HCG UR QL: NEGATIVE

## 2023-06-06 PROCEDURE — 81025 URINE PREGNANCY TEST: CPT

## 2023-06-06 PROCEDURE — 70486 CT MAXILLOFACIAL W/O DYE: CPT

## 2023-06-06 PROCEDURE — 70450 CT HEAD/BRAIN W/O DYE: CPT

## 2023-06-06 PROCEDURE — 6370000000 HC RX 637 (ALT 250 FOR IP): Performed by: EMERGENCY MEDICINE

## 2023-06-06 PROCEDURE — 71046 X-RAY EXAM CHEST 2 VIEWS: CPT

## 2023-06-06 PROCEDURE — 73030 X-RAY EXAM OF SHOULDER: CPT

## 2023-06-06 PROCEDURE — 99284 EMERGENCY DEPT VISIT MOD MDM: CPT

## 2023-06-06 PROCEDURE — 72125 CT NECK SPINE W/O DYE: CPT

## 2023-06-06 RX ORDER — ACETAMINOPHEN 500 MG
1000 TABLET ORAL ONCE
Status: COMPLETED | OUTPATIENT
Start: 2023-06-06 | End: 2023-06-06

## 2023-06-06 RX ORDER — IBUPROFEN 600 MG/1
600 TABLET ORAL EVERY 6 HOURS PRN
Status: DISCONTINUED | OUTPATIENT
Start: 2023-06-06 | End: 2023-06-06 | Stop reason: HOSPADM

## 2023-06-06 RX ORDER — ONDANSETRON 4 MG/1
4 TABLET, ORALLY DISINTEGRATING ORAL
Status: COMPLETED | OUTPATIENT
Start: 2023-06-06 | End: 2023-06-06

## 2023-06-06 RX ORDER — ONDANSETRON 4 MG/1
4 TABLET, ORALLY DISINTEGRATING ORAL 3 TIMES DAILY PRN
Qty: 10 TABLET | Refills: 0 | Status: SHIPPED | OUTPATIENT
Start: 2023-06-06

## 2023-06-06 RX ADMIN — IBUPROFEN 600 MG: 600 TABLET, FILM COATED ORAL at 18:26

## 2023-06-06 RX ADMIN — ACETAMINOPHEN 1000 MG: 500 TABLET ORAL at 18:25

## 2023-06-06 RX ADMIN — ONDANSETRON 4 MG: 4 TABLET, ORALLY DISINTEGRATING ORAL at 19:19

## 2023-06-06 ASSESSMENT — ENCOUNTER SYMPTOMS
TROUBLE SWALLOWING: 0
RHINORRHEA: 0
COLOR CHANGE: 0
NAUSEA: 1
SHORTNESS OF BREATH: 0
VOMITING: 1
COUGH: 0
PHOTOPHOBIA: 0
BACK PAIN: 0
ABDOMINAL PAIN: 0

## 2023-06-06 ASSESSMENT — PAIN SCALES - GENERAL
PAINLEVEL_OUTOF10: 9
PAINLEVEL_OUTOF10: 9
PAINLEVEL_OUTOF10: 7

## 2023-06-06 ASSESSMENT — PAIN DESCRIPTION - LOCATION: LOCATION: HEAD

## 2023-06-06 ASSESSMENT — PAIN DESCRIPTION - DESCRIPTORS: DESCRIPTORS: ACHING

## 2023-06-06 ASSESSMENT — VISUAL ACUITY: OU: 1

## 2023-06-06 ASSESSMENT — PAIN - FUNCTIONAL ASSESSMENT: PAIN_FUNCTIONAL_ASSESSMENT: 0-10
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
## 2023-06-06 NOTE — DISCHARGE INSTRUCTIONS
Use either ibuprofen, Advil or Tylenol as needed for pain  Zofran: 1 tablet every 8 hours as needed for nausea. Rest your brain. If you are doing an activity and it hurts your head did not stop.   Avoid staring at the computer or your phone for the next several days  Return to the emergency room for persistent nausea or vomiting, worsening of dizziness, lightheadedness, worsening or changing headaches, questions or concerns

## 2023-07-10 ENCOUNTER — HOSPITAL ENCOUNTER (EMERGENCY)
Facility: HOSPITAL | Age: 33
Discharge: HOME | End: 2023-07-10
Attending: STUDENT IN AN ORGANIZED HEALTH CARE EDUCATION/TRAINING PROGRAM
Payer: COMMERCIAL

## 2023-07-10 ENCOUNTER — APPOINTMENT (OUTPATIENT)
Facility: HOSPITAL | Age: 33
End: 2023-07-10
Payer: COMMERCIAL

## 2023-07-10 VITALS
OXYGEN SATURATION: 98 % | DIASTOLIC BLOOD PRESSURE: 65 MMHG | HEART RATE: 73 BPM | SYSTOLIC BLOOD PRESSURE: 114 MMHG | RESPIRATION RATE: 16 BRPM | TEMPERATURE: 98.1 F

## 2023-07-10 DIAGNOSIS — R19.7 DIARRHEA, UNSPECIFIED TYPE: ICD-10-CM

## 2023-07-10 DIAGNOSIS — R10.84 GENERALIZED ABDOMINAL PAIN: Primary | ICD-10-CM

## 2023-07-10 LAB
ALBUMIN SERPL-MCNC: 3.6 G/DL (ref 3.5–5)
ALBUMIN/GLOB SERPL: 1.1 (ref 1.1–2.2)
ALP SERPL-CCNC: 61 U/L (ref 45–117)
ALT SERPL-CCNC: 18 U/L (ref 12–78)
ANION GAP SERPL CALC-SCNC: 9 MMOL/L (ref 5–15)
APPEARANCE UR: CLEAR
AST SERPL-CCNC: 8 U/L (ref 15–37)
BACTERIA URNS QL MICRO: NEGATIVE /HPF
BASOPHILS # BLD: 0 K/UL (ref 0–0.1)
BASOPHILS NFR BLD: 0 % (ref 0–1)
BILIRUB SERPL-MCNC: 0.3 MG/DL (ref 0.2–1)
BILIRUB UR QL: NEGATIVE
BUN SERPL-MCNC: 10 MG/DL (ref 6–20)
BUN/CREAT SERPL: 13 (ref 12–20)
CALCIUM SERPL-MCNC: 8.8 MG/DL (ref 8.5–10.1)
CHLORIDE SERPL-SCNC: 104 MMOL/L (ref 97–108)
CO2 SERPL-SCNC: 27 MMOL/L (ref 21–32)
COLOR UR: NORMAL
CREAT SERPL-MCNC: 0.8 MG/DL (ref 0.55–1.02)
DIFFERENTIAL METHOD BLD: ABNORMAL
EOSINOPHIL # BLD: 0.5 K/UL (ref 0–0.4)
EOSINOPHIL NFR BLD: 6 % (ref 0–7)
EPITH CASTS URNS QL MICRO: NORMAL /LPF
ERYTHROCYTE [DISTWIDTH] IN BLOOD BY AUTOMATED COUNT: 12.4 % (ref 11.5–14.5)
GLOBULIN SER CALC-MCNC: 3.2 G/DL (ref 2–4)
GLUCOSE SERPL-MCNC: 108 MG/DL (ref 65–100)
GLUCOSE UR STRIP.AUTO-MCNC: NEGATIVE MG/DL
HCG UR QL: NEGATIVE
HCT VFR BLD AUTO: 39.2 % (ref 35–47)
HGB BLD-MCNC: 13.9 G/DL (ref 11.5–16)
HGB UR QL STRIP: NEGATIVE
IMM GRANULOCYTES # BLD AUTO: 0 K/UL (ref 0–0.04)
IMM GRANULOCYTES NFR BLD AUTO: 0 % (ref 0–0.5)
KETONES UR QL STRIP.AUTO: NEGATIVE MG/DL
LEUKOCYTE ESTERASE UR QL STRIP.AUTO: NEGATIVE
LIPASE SERPL-CCNC: 58 U/L (ref 73–393)
LYMPHOCYTES # BLD: 2.9 K/UL (ref 0.8–3.5)
LYMPHOCYTES NFR BLD: 31 % (ref 12–49)
MCH RBC QN AUTO: 31.4 PG (ref 26–34)
MCHC RBC AUTO-ENTMCNC: 35.5 G/DL (ref 30–36.5)
MCV RBC AUTO: 88.5 FL (ref 80–99)
MONOCYTES # BLD: 0.8 K/UL (ref 0–1)
MONOCYTES NFR BLD: 9 % (ref 5–13)
NEUTS SEG # BLD: 4.8 K/UL (ref 1.8–8)
NEUTS SEG NFR BLD: 54 % (ref 32–75)
NITRITE UR QL STRIP.AUTO: NEGATIVE
NRBC # BLD: 0 K/UL (ref 0–0.01)
NRBC BLD-RTO: 0 PER 100 WBC
PH UR STRIP: 6.5 (ref 5–8)
PLATELET # BLD AUTO: 281 K/UL (ref 150–400)
PMV BLD AUTO: 10.2 FL (ref 8.9–12.9)
POTASSIUM SERPL-SCNC: 3.5 MMOL/L (ref 3.5–5.1)
PROT SERPL-MCNC: 6.8 G/DL (ref 6.4–8.2)
PROT UR STRIP-MCNC: NEGATIVE MG/DL
RBC # BLD AUTO: 4.43 M/UL (ref 3.8–5.2)
RBC #/AREA URNS HPF: NORMAL /HPF (ref 0–5)
SODIUM SERPL-SCNC: 140 MMOL/L (ref 136–145)
SP GR UR REFRACTOMETRY: 1.01 (ref 1–1.03)
URINE CULTURE IF INDICATED: NORMAL
UROBILINOGEN UR QL STRIP.AUTO: 0.2 EU/DL (ref 0.2–1)
WBC # BLD AUTO: 9.1 K/UL (ref 3.6–11)
WBC URNS QL MICRO: NORMAL /HPF (ref 0–4)

## 2023-07-10 PROCEDURE — 85025 COMPLETE CBC W/AUTO DIFF WBC: CPT

## 2023-07-10 PROCEDURE — 81025 URINE PREGNANCY TEST: CPT

## 2023-07-10 PROCEDURE — 36415 COLL VENOUS BLD VENIPUNCTURE: CPT

## 2023-07-10 PROCEDURE — 6370000000 HC RX 637 (ALT 250 FOR IP)

## 2023-07-10 PROCEDURE — 6360000004 HC RX CONTRAST MEDICATION: Performed by: STUDENT IN AN ORGANIZED HEALTH CARE EDUCATION/TRAINING PROGRAM

## 2023-07-10 PROCEDURE — 81001 URINALYSIS AUTO W/SCOPE: CPT

## 2023-07-10 PROCEDURE — 83690 ASSAY OF LIPASE: CPT

## 2023-07-10 PROCEDURE — 99285 EMERGENCY DEPT VISIT HI MDM: CPT

## 2023-07-10 PROCEDURE — 80053 COMPREHEN METABOLIC PANEL: CPT

## 2023-07-10 PROCEDURE — 74177 CT ABD & PELVIS W/CONTRAST: CPT

## 2023-07-10 RX ORDER — DICYCLOMINE HCL 20 MG
20 TABLET ORAL 4 TIMES DAILY
Qty: 40 TABLET | Refills: 0 | Status: SHIPPED | OUTPATIENT
Start: 2023-07-10 | End: 2023-07-10 | Stop reason: SDUPTHER

## 2023-07-10 RX ORDER — DICYCLOMINE HYDROCHLORIDE 10 MG/1
20 CAPSULE ORAL ONCE
Status: COMPLETED | OUTPATIENT
Start: 2023-07-10 | End: 2023-07-10

## 2023-07-10 RX ORDER — DICYCLOMINE HCL 20 MG
20 TABLET ORAL 4 TIMES DAILY
Qty: 40 TABLET | Refills: 0 | Status: SHIPPED | OUTPATIENT
Start: 2023-07-10 | End: 2023-07-20

## 2023-07-10 RX ORDER — FAMOTIDINE 20 MG/1
20 TABLET, FILM COATED ORAL 2 TIMES DAILY
Qty: 60 TABLET | Refills: 3 | Status: SHIPPED | OUTPATIENT
Start: 2023-07-10

## 2023-07-10 RX ADMIN — DICYCLOMINE HYDROCHLORIDE 20 MG: 10 CAPSULE ORAL at 18:35

## 2023-07-10 RX ADMIN — IOPAMIDOL 100 ML: 755 INJECTION, SOLUTION INTRAVENOUS at 19:08

## 2023-07-10 ASSESSMENT — PAIN SCALES - GENERAL: PAINLEVEL_OUTOF10: 6

## 2023-07-10 ASSESSMENT — PAIN DESCRIPTION - LOCATION: LOCATION: ABDOMEN

## 2023-07-10 ASSESSMENT — PAIN DESCRIPTION - DESCRIPTORS: DESCRIPTORS: CRAMPING

## 2023-07-10 ASSESSMENT — ENCOUNTER SYMPTOMS: SHORTNESS OF BREATH: 0

## 2023-07-10 NOTE — ED PROVIDER NOTES
[facility] EMERGENCY CTR  EMERGENCY DEPARTMENT ENCOUNTER      Pt Name: Elayne Kohli  MRN: [id number]  [address] [place] 1990  Date of evaluation: 7/10/2023  Provider: Brendon John PA-C    CHIEF COMPLAINT       Chief Complaint   Patient presents with    Abdominal Pain    Rectal Bleeding         HISTORY OF PRESENT ILLNESS   (Location/Symptom, Timing/Onset, Context/Setting, Quality, Duration, Modifying Factors, Severity)  Note limiting factors. 62-year-old female reports to ED with complaints of 2 weeks of constant diarrhea and today noticed bright red blood with clots during episode of diarrhea. Endorses epigastric pain shortly after eating though diffuse abdominal pain has been common as well. Pain now is currently 5/10, but during intense episodes, 10/10. Denies nausea or vomiting. Denies abdominal surgical or medical history. Review of External Medical Records:     Nursing Notes were reviewed. REVIEW OF SYSTEMS    (2-9 systems for level 4, 10 or more for level 5)     Review of Systems   Constitutional:  Negative for appetite change and fever. Respiratory:  Negative for shortness of breath. Cardiovascular:  Negative for chest pain. Genitourinary:  Negative for difficulty urinating and dysuria. Except as noted above the remainder of the review of systems was reviewed and negative.        PAST MEDICAL HISTORY     Past Medical History:   Diagnosis Date    H/O craniotomy          SURGICAL HISTORY       Past Surgical History:   Procedure Laterality Date    ANTERIOR CRUCIATE LIGAMENT REPAIR      CRANIOTOMY  2007    OTHER SURGICAL HISTORY      Brain Surgery/Craniotomy          CURRENT MEDICATIONS       Discharge Medication List as of 7/10/2023  8:25 PM        CONTINUE these medications which have NOT CHANGED    Details   ondansetron (ZOFRAN-ODT) 4 MG disintegrating tablet Take 1 tablet by mouth 3 times daily as needed for Nausea or Vomiting, Disp-10 tablet, R-0Normal      ALPRAZolam

## 2023-07-10 NOTE — ED TRIAGE NOTES
Triage: pt arrives to the ER for c/o diarrhea x2 weeks. Today noticed bright red blood in stool with clots. Denies fever, N/V. Pt has not been on any antibiotics nor has she traveled recently.

## 2023-07-10 NOTE — ED NOTES
Verbal shift change report given to Faith Kitchen RN (oncoming nurse) by [address], RN (offgoing nurse). Report included the following information Nurse Handoff Report, ED Encounter Summary, ED SBAR, MAR, and Recent Results.         Sabas Stephen RN  07/10/23 1945

## 2023-07-11 ENCOUNTER — HOSPITAL ENCOUNTER (EMERGENCY)
Facility: HOSPITAL | Age: 33
Discharge: HOME | End: 2023-07-11
Attending: STUDENT IN AN ORGANIZED HEALTH CARE EDUCATION/TRAINING PROGRAM

## 2023-07-11 ENCOUNTER — APPOINTMENT (OUTPATIENT)
Facility: HOSPITAL | Age: 33
End: 2023-07-11

## 2023-07-11 VITALS
DIASTOLIC BLOOD PRESSURE: 69 MMHG | OXYGEN SATURATION: 98 % | HEART RATE: 74 BPM | RESPIRATION RATE: 15 BRPM | TEMPERATURE: 98.6 F | SYSTOLIC BLOOD PRESSURE: 103 MMHG

## 2023-07-11 DIAGNOSIS — R19.7 DIARRHEA, UNSPECIFIED TYPE: ICD-10-CM

## 2023-07-11 DIAGNOSIS — K92.1 BLOODY STOOL: ICD-10-CM

## 2023-07-11 DIAGNOSIS — R10.84 GENERALIZED ABDOMINAL PAIN: Primary | ICD-10-CM

## 2023-07-11 LAB
ALBUMIN SERPL-MCNC: 3.4 G/DL (ref 3.5–5)
ALBUMIN/GLOB SERPL: 1.1 (ref 1.1–2.2)
ALP SERPL-CCNC: 53 U/L (ref 45–117)
ALT SERPL-CCNC: 16 U/L (ref 12–78)
ANION GAP SERPL CALC-SCNC: 2 MMOL/L (ref 5–15)
AST SERPL-CCNC: 10 U/L (ref 15–37)
BASOPHILS # BLD: 0 K/UL (ref 0–0.1)
BASOPHILS NFR BLD: 0 % (ref 0–1)
BILIRUB SERPL-MCNC: 0.3 MG/DL (ref 0.2–1)
BUN SERPL-MCNC: 7 MG/DL (ref 6–20)
BUN/CREAT SERPL: 10 (ref 12–20)
CALCIUM SERPL-MCNC: 8.5 MG/DL (ref 8.5–10.1)
CHLORIDE SERPL-SCNC: 110 MMOL/L (ref 97–108)
CO2 SERPL-SCNC: 27 MMOL/L (ref 21–32)
COMMENT:: NORMAL
COMMENT:: NORMAL
CREAT SERPL-MCNC: 0.67 MG/DL (ref 0.55–1.02)
DIFFERENTIAL METHOD BLD: ABNORMAL
EKG ATRIAL RATE: 88 BPM
EKG DIAGNOSIS: NORMAL
EKG P AXIS: 53 DEGREES
EKG P-R INTERVAL: 150 MS
EKG Q-T INTERVAL: 354 MS
EKG QRS DURATION: 80 MS
EKG QTC CALCULATION (BAZETT): 428 MS
EKG R AXIS: 63 DEGREES
EKG T AXIS: 35 DEGREES
EKG VENTRICULAR RATE: 88 BPM
EOSINOPHIL # BLD: 0.5 K/UL (ref 0–0.4)
EOSINOPHIL NFR BLD: 4 % (ref 0–7)
ERYTHROCYTE [DISTWIDTH] IN BLOOD BY AUTOMATED COUNT: 12.5 % (ref 11.5–14.5)
GLOBULIN SER CALC-MCNC: 3 G/DL (ref 2–4)
GLUCOSE SERPL-MCNC: 117 MG/DL (ref 65–100)
HCT VFR BLD AUTO: 38.9 % (ref 35–47)
HEMOCCULT STL QL: POSITIVE
HGB BLD-MCNC: 13.4 G/DL (ref 11.5–16)
IMM GRANULOCYTES # BLD AUTO: 0.1 K/UL (ref 0–0.04)
IMM GRANULOCYTES NFR BLD AUTO: 1 % (ref 0–0.5)
LIPASE SERPL-CCNC: 67 U/L (ref 73–393)
LYMPHOCYTES # BLD: 2.7 K/UL (ref 0.8–3.5)
LYMPHOCYTES NFR BLD: 24 % (ref 12–49)
MCH RBC QN AUTO: 31.3 PG (ref 26–34)
MCHC RBC AUTO-ENTMCNC: 34.4 G/DL (ref 30–36.5)
MCV RBC AUTO: 90.9 FL (ref 80–99)
MONOCYTES # BLD: 0.8 K/UL (ref 0–1)
MONOCYTES NFR BLD: 7 % (ref 5–13)
NEUTS SEG # BLD: 7.4 K/UL (ref 1.8–8)
NEUTS SEG NFR BLD: 64 % (ref 32–75)
NRBC # BLD: 0 K/UL (ref 0–0.01)
NRBC BLD-RTO: 0 PER 100 WBC
PLATELET # BLD AUTO: 280 K/UL (ref 150–400)
PMV BLD AUTO: 10.4 FL (ref 8.9–12.9)
POTASSIUM SERPL-SCNC: 3.4 MMOL/L (ref 3.5–5.1)
PROT SERPL-MCNC: 6.4 G/DL (ref 6.4–8.2)
RBC # BLD AUTO: 4.28 M/UL (ref 3.8–5.2)
SODIUM SERPL-SCNC: 139 MMOL/L (ref 136–145)
SPECIMEN HOLD: NORMAL
SPECIMEN HOLD: NORMAL
TROPONIN I SERPL HS-MCNC: <3 NG/L (ref 0–37)
WBC # BLD AUTO: 11.5 K/UL (ref 3.6–11)

## 2023-07-11 PROCEDURE — 87493 C DIFF AMPLIFIED PROBE: CPT

## 2023-07-11 PROCEDURE — 80053 COMPREHEN METABOLIC PANEL: CPT

## 2023-07-11 PROCEDURE — 87177 OVA AND PARASITES SMEARS: CPT

## 2023-07-11 PROCEDURE — 93005 ELECTROCARDIOGRAM TRACING: CPT | Performed by: STUDENT IN AN ORGANIZED HEALTH CARE EDUCATION/TRAINING PROGRAM

## 2023-07-11 PROCEDURE — 87329 GIARDIA AG IA: CPT

## 2023-07-11 PROCEDURE — 84484 ASSAY OF TROPONIN QUANT: CPT

## 2023-07-11 PROCEDURE — 6370000000 HC RX 637 (ALT 250 FOR IP): Performed by: PHYSICIAN ASSISTANT

## 2023-07-11 PROCEDURE — 6360000002 HC RX W HCPCS: Performed by: PHYSICIAN ASSISTANT

## 2023-07-11 PROCEDURE — 82272 OCCULT BLD FECES 1-3 TESTS: CPT

## 2023-07-11 PROCEDURE — 87324 CLOSTRIDIUM AG IA: CPT

## 2023-07-11 PROCEDURE — 96374 THER/PROPH/DIAG INJ IV PUSH: CPT

## 2023-07-11 PROCEDURE — 36415 COLL VENOUS BLD VENIPUNCTURE: CPT

## 2023-07-11 PROCEDURE — 71046 X-RAY EXAM CHEST 2 VIEWS: CPT

## 2023-07-11 PROCEDURE — 2580000003 HC RX 258: Performed by: PHYSICIAN ASSISTANT

## 2023-07-11 PROCEDURE — 83690 ASSAY OF LIPASE: CPT

## 2023-07-11 PROCEDURE — 87209 SMEAR COMPLEX STAIN: CPT

## 2023-07-11 PROCEDURE — 87186 SC STD MICRODIL/AGAR DIL: CPT

## 2023-07-11 PROCEDURE — 85025 COMPLETE CBC W/AUTO DIFF WBC: CPT

## 2023-07-11 PROCEDURE — 99285 EMERGENCY DEPT VISIT HI MDM: CPT

## 2023-07-11 PROCEDURE — 87506 IADNA-DNA/RNA PROBE TQ 6-11: CPT

## 2023-07-11 PROCEDURE — 87449 NOS EACH ORGANISM AG IA: CPT

## 2023-07-11 RX ORDER — KETOROLAC TROMETHAMINE 30 MG/ML
15 INJECTION, SOLUTION INTRAMUSCULAR; INTRAVENOUS
Status: COMPLETED | OUTPATIENT
Start: 2023-07-11 | End: 2023-07-11

## 2023-07-11 RX ORDER — HYDROCODONE BITARTRATE AND ACETAMINOPHEN 5; 325 MG/1; MG/1
1 TABLET ORAL EVERY 6 HOURS PRN
Qty: 20 TABLET | Refills: 0 | Status: SHIPPED | OUTPATIENT
Start: 2023-07-11 | End: 2023-07-16

## 2023-07-11 RX ORDER — 0.9 % SODIUM CHLORIDE 0.9 %
1000 INTRAVENOUS SOLUTION INTRAVENOUS ONCE
Status: COMPLETED | OUTPATIENT
Start: 2023-07-11 | End: 2023-07-11

## 2023-07-11 RX ORDER — DICYCLOMINE HYDROCHLORIDE 10 MG/1
20 CAPSULE ORAL
Status: COMPLETED | OUTPATIENT
Start: 2023-07-11 | End: 2023-07-11

## 2023-07-11 RX ORDER — HYDROCODONE BITARTRATE AND ACETAMINOPHEN 5; 325 MG/1; MG/1
1 TABLET ORAL
Status: COMPLETED | OUTPATIENT
Start: 2023-07-11 | End: 2023-07-11

## 2023-07-11 RX ORDER — DICYCLOMINE HCL 20 MG
20 TABLET ORAL EVERY 6 HOURS PRN
Qty: 30 TABLET | Refills: 0 | Status: SHIPPED | OUTPATIENT
Start: 2023-07-11

## 2023-07-11 RX ADMIN — DICYCLOMINE HYDROCHLORIDE 20 MG: 10 CAPSULE ORAL at 15:09

## 2023-07-11 RX ADMIN — HYDROCODONE BITARTRATE AND ACETAMINOPHEN 1 TABLET: 5; 325 TABLET ORAL at 16:39

## 2023-07-11 RX ADMIN — KETOROLAC TROMETHAMINE 15 MG: 30 INJECTION, SOLUTION INTRAMUSCULAR; INTRAVENOUS at 15:09

## 2023-07-11 RX ADMIN — SODIUM CHLORIDE 1000 ML: 9 INJECTION, SOLUTION INTRAVENOUS at 15:10

## 2023-07-11 ASSESSMENT — ENCOUNTER SYMPTOMS
CHEST TIGHTNESS: 1
ABDOMINAL PAIN: 1
BLOOD IN STOOL: 1
VOMITING: 0
DIARRHEA: 1

## 2023-07-11 NOTE — ED NOTES
Discharge instructions on medications, follow up care and symptom management discussed with patient. Opportunity for questions was given and questions answered.         Farhana Kinsey RN  07/10/23 3481

## 2023-07-11 NOTE — ED PROVIDER NOTES
[address] EMERGENCY DEP  EMERGENCY DEPARTMENT ENCOUNTER      Pt Name: Garth Bowers  MRN: [id number]  [address] [facility] 1990  Date of evaluation: 7/11/2023  Provider: Anne Polanco       Chief Complaint   Patient presents with    Chest Pain    Abdominal Pain         HISTORY OF PRESENT ILLNESS   (Location/Symptom, Timing/Onset, Context/Setting, Quality, Duration, Modifying Factors, Severity)  Note limiting factors. 32 y/o female presenting with complaint of abdominal pain and chest pain. The patient states that she has had diarrhea for the past 3 weeks, with associated abdominal pain and bloody stool. She was seen at [facility] last night, and had labs and CT which were unremarkable. Around 11:00 this morning she also began to experience left-sided chest pain, prompting her visit to the ED. The pain has resolved, but she continues to have some mild chest tightness. She denies fever, vomiting, dysuria, urgency/frequency, generalized body aches or syncope. The history is provided by the patient. Review of External Medical Records:     Nursing Notes were reviewed. REVIEW OF SYSTEMS    (2-9 systems for level 4, 10 or more for level 5)     Review of Systems   Constitutional:  Negative for chills and fever. Respiratory:  Positive for chest tightness. Cardiovascular:  Positive for chest pain. Gastrointestinal:  Positive for abdominal pain, blood in stool and diarrhea. Negative for vomiting. Genitourinary:  Negative for dysuria, frequency and urgency. Musculoskeletal:  Negative for myalgias. Neurological:  Negative for syncope. Except as noted above the remainder of the review of systems was reviewed and negative.        PAST MEDICAL HISTORY     Past Medical History:   Diagnosis Date    H/O craniotomy          SURGICAL HISTORY       Past Surgical History:   Procedure Laterality Date    ANTERIOR CRUCIATE LIGAMENT REPAIR      CRANIOTOMY  2007    OTHER SURGICAL HISTORY

## 2023-07-11 NOTE — ED TRIAGE NOTES
Pt has been having abd pain and GI issues for 3 weeks with abd pain, diarrhea, gas pain, blood in stools, now having chest pain , seen at [place] for the same, called them today and they told her to come to [facility] , +sob, denies fever, denies cough , denies pregnancy

## 2023-07-11 NOTE — DISCHARGE INSTRUCTIONS
The CT scan and lab work that was taken today did not show any concerning abnormalities. We would like for you to follow-up with [facility] for further evaluation. Given your symptoms, please do consider taking loperamide/Imodium as indicated on the box. It would also be a good idea to start keeping a diary of your symptoms associated when eating certain foods. If symptoms worsen or new symptoms arise, please report to the nearest emergency department. Thank you for allowing us to provide you with medical care today. We realize that you have many choices for your emergency care needs. We thank you for choosing [facility]. Please choose us in the future for any continued health care needs. The exam and treatment you received in the Emergency Department were for an emergent problem and are not intended as complete care. It is important that you follow up with a doctor, nurse practitioner, or physician's assistant for ongoing care. If your symptoms worsen or you do not improve as expected and you are unable to reach your usual health care provider, you should return to the Emergency Department. We are available 24 hours a day. Please make an appointment with your health care provider(s) for follow up of your Emergency Department visit. Take this sheet with you when you go to your follow-up visit.

## 2023-07-12 LAB
C COLI+JEJUNI TUF STL QL NAA+PROBE: NEGATIVE
C DIFF TOX GENS STL QL NAA+PROBE: POSITIVE
C DIFF TOXIN INTERPRETATION: ABNORMAL
EC STX1+STX2 GENES STL QL NAA+PROBE: NEGATIVE
ETEC ELTA+ESTB GENES STL QL NAA+PROBE: NEGATIVE
P SHIGELLOIDES DNA STL QL NAA+PROBE: NEGATIVE
REFLEX: ABNORMAL
SALMONELLA SP SPAO STL QL NAA+PROBE: NEGATIVE
SHIGELLA SP+EIEC IPAH STL QL NAA+PROBE: NEGATIVE
V CHOL+PARA+VUL DNA STL QL NAA+NON-PROBE: NEGATIVE
Y ENTEROCOL DNA STL QL NAA+NON-PROBE: NEGATIVE

## 2023-07-12 RX ORDER — VANCOMYCIN HYDROCHLORIDE 125 MG/1
125 CAPSULE ORAL 4 TIMES DAILY
Qty: 40 CAPSULE | Refills: 0 | Status: SHIPPED | OUTPATIENT
Start: 2023-07-12 | End: 2023-07-12 | Stop reason: SDUPTHER

## 2023-07-12 RX ORDER — VANCOMYCIN HYDROCHLORIDE 125 MG/1
125 CAPSULE ORAL 4 TIMES DAILY
Qty: 40 CAPSULE | Refills: 0 | Status: SHIPPED | OUTPATIENT
Start: 2023-07-12 | End: 2023-07-22

## 2023-07-12 NOTE — ED PROVIDER NOTES
I was notified by the micro lab while on shift that the patient tested positive for C. difficile. I spoke with the patient over the phone. She is continuing to have some crampy abdominal pain despite the Bentyl. No worsening symptoms or fever. She is aware of her testing result and was prescribed oral vancomycin. Discussed hygienic measures at home and the importance of hand hygiene.      Jonathan Deluna MD  07/12/23 9820

## 2023-07-14 LAB
G LAMBLIA AG STL QL IA: NEGATIVE
O+P STL CONC: NORMAL
SPECIMEN SOURCE: NORMAL

## 2024-02-09 NOTE — ED NOTES
Patient/parent has been given discharge instructions to home by PA/MD. Pt/parent has verbalized understanding and is ambulatory to the exit without difficulty. Pt in NAD at the time of discharge.   Mood and Affect: Mood normal.        DIAGNOSTIC RESULTS   LABS:     No results found for this or any previous visit (from the past 12 hour(s)).    EKG: When ordered, EKG's are interpreted by the Emergency Department Physician in the absence of a cardiologist.  Please see their note for interpretation of EKG.      RADIOLOGY:  Non-plain film images such as CT, Ultrasound and MRI are read by the radiologist. Plain radiographic images are visualized and preliminarily interpreted by the ED Provider with the below findings:       Interpretation per the Radiologist below, if available at the time of this note:     XR ANKLE LEFT (MIN 3 VIEWS)   Final Result   Lateral soft tissue injury without fracture or radiopaque foreign   body.           PROCEDURES   Unless otherwise noted below, none  Lac Repair    Date/Time: 2/9/2024 9:49 AM    Performed by: Janet Daniel PA-C  Authorized by: Janet Daniel PA-C    Consent:     Consent obtained:  Verbal    Consent given by:  Patient    Risks discussed:  Infection  Exploration:     Hemostasis achieved with:  Epinephrine    Imaging outcome: foreign body not noted    Treatment:     Area cleansed with:  Povidone-iodine    Amount of cleaning:  Extensive    Irrigation solution:  Sterile water    Irrigation method:  Syringe    Debridement:  None  Skin repair:     Repair method:  Sutures    Suture size:  4-0    Suture material:  Nylon    Suture technique:  Simple interrupted    Number of sutures:  5  Approximation:     Approximation:  Loose  Repair type:     Repair type:  Simple  Post-procedure details:     Dressing:  Non-adherent dressing    Procedure completion:  Tolerated       CRITICAL CARE TIME       EMERGENCY DEPARTMENT COURSE and DIFFERENTIAL DIAGNOSIS/MDM   Vitals:    Vitals:    02/09/24 0830 02/09/24 0842   BP: (!) 179/71    Pulse: 58    Resp: 18    Temp: 98.6 °F (37 °C) 98.6 °F (37 °C)   TempSrc: Oral Oral   SpO2: 97%    Weight:  81 kg (178 lb 9.2 oz)   Height:

## 2025-04-29 ENCOUNTER — HOSPITAL ENCOUNTER (EMERGENCY)
Facility: HOSPITAL | Age: 35
Discharge: HOME | End: 2025-04-29
Attending: EMERGENCY MEDICINE
Payer: COMMERCIAL

## 2025-04-29 ENCOUNTER — APPOINTMENT (OUTPATIENT)
Facility: HOSPITAL | Age: 35
End: 2025-04-29
Payer: COMMERCIAL

## 2025-04-29 VITALS
BODY MASS INDEX: 28.98 KG/M2 | RESPIRATION RATE: 13 BRPM | SYSTOLIC BLOOD PRESSURE: 131 MMHG | HEIGHT: 66 IN | TEMPERATURE: 98.3 F | HEART RATE: 82 BPM | DIASTOLIC BLOOD PRESSURE: 95 MMHG | OXYGEN SATURATION: 100 % | WEIGHT: 180.34 LBS

## 2025-04-29 DIAGNOSIS — R07.9 CHEST PAIN, UNSPECIFIED TYPE: ICD-10-CM

## 2025-04-29 DIAGNOSIS — F41.1 ANXIETY STATE: ICD-10-CM

## 2025-04-29 DIAGNOSIS — R06.02 SHORTNESS OF BREATH: Primary | ICD-10-CM

## 2025-04-29 LAB
ALBUMIN SERPL-MCNC: 4.2 G/DL (ref 3.5–5)
ALBUMIN/GLOB SERPL: 1.3 (ref 1.1–2.2)
ALP SERPL-CCNC: 55 U/L (ref 45–117)
ALT SERPL-CCNC: 19 U/L (ref 12–78)
ANION GAP SERPL CALC-SCNC: 10 MMOL/L (ref 2–12)
AST SERPL-CCNC: 14 U/L (ref 15–37)
BASOPHILS # BLD: 0.03 K/UL (ref 0–0.1)
BASOPHILS NFR BLD: 0.3 % (ref 0–1)
BILIRUB SERPL-MCNC: 0.7 MG/DL (ref 0.2–1)
BUN SERPL-MCNC: 18 MG/DL (ref 6–20)
BUN/CREAT SERPL: 24 (ref 12–20)
CALCIUM SERPL-MCNC: 9.7 MG/DL (ref 8.5–10.1)
CHLORIDE SERPL-SCNC: 103 MMOL/L (ref 97–108)
CO2 SERPL-SCNC: 26 MMOL/L (ref 21–32)
COMMENT:: NORMAL
CREAT SERPL-MCNC: 0.74 MG/DL (ref 0.55–1.02)
D DIMER PPP FEU-MCNC: 0.27 MG/L FEU (ref 0–0.65)
DIFFERENTIAL METHOD BLD: NORMAL
EOSINOPHIL # BLD: 0.12 K/UL (ref 0–0.4)
EOSINOPHIL NFR BLD: 1.2 % (ref 0–7)
ERYTHROCYTE [DISTWIDTH] IN BLOOD BY AUTOMATED COUNT: 12 % (ref 11.5–14.5)
GLOBULIN SER CALC-MCNC: 3.2 G/DL (ref 2–4)
GLUCOSE SERPL-MCNC: 91 MG/DL (ref 65–100)
HCT VFR BLD AUTO: 39.7 % (ref 35–47)
HGB BLD-MCNC: 14.1 G/DL (ref 11.5–16)
IMM GRANULOCYTES # BLD AUTO: 0.03 K/UL (ref 0–0.04)
IMM GRANULOCYTES NFR BLD AUTO: 0.3 % (ref 0–0.5)
LYMPHOCYTES # BLD: 2.48 K/UL (ref 0.8–3.5)
LYMPHOCYTES NFR BLD: 24.7 % (ref 12–49)
MCH RBC QN AUTO: 31.7 PG (ref 26–34)
MCHC RBC AUTO-ENTMCNC: 35.5 G/DL (ref 30–36.5)
MCV RBC AUTO: 89.2 FL (ref 80–99)
MONOCYTES # BLD: 0.64 K/UL (ref 0–1)
MONOCYTES NFR BLD: 6.4 % (ref 5–13)
NEUTS SEG # BLD: 6.76 K/UL (ref 1.8–8)
NEUTS SEG NFR BLD: 67.1 % (ref 32–75)
NRBC # BLD: 0 K/UL (ref 0–0.01)
NRBC BLD-RTO: 0 PER 100 WBC
PLATELET # BLD AUTO: 229 K/UL (ref 150–400)
PMV BLD AUTO: 11 FL (ref 8.9–12.9)
POTASSIUM SERPL-SCNC: 3.7 MMOL/L (ref 3.5–5.1)
PROT SERPL-MCNC: 7.4 G/DL (ref 6.4–8.2)
RBC # BLD AUTO: 4.45 M/UL (ref 3.8–5.2)
SODIUM SERPL-SCNC: 139 MMOL/L (ref 136–145)
SPECIMEN HOLD: NORMAL
TROPONIN I SERPL HS-MCNC: 5 NG/L (ref 0–51)
TSH SERPL DL<=0.05 MIU/L-ACNC: 0.64 UIU/ML (ref 0.36–3.74)
WBC # BLD AUTO: 10.1 K/UL (ref 3.6–11)

## 2025-04-29 PROCEDURE — 80053 COMPREHEN METABOLIC PANEL: CPT

## 2025-04-29 PROCEDURE — 93005 ELECTROCARDIOGRAM TRACING: CPT | Performed by: EMERGENCY MEDICINE

## 2025-04-29 PROCEDURE — 6370000000 HC RX 637 (ALT 250 FOR IP): Performed by: EMERGENCY MEDICINE

## 2025-04-29 PROCEDURE — 99285 EMERGENCY DEPT VISIT HI MDM: CPT

## 2025-04-29 PROCEDURE — 2580000003 HC RX 258: Performed by: EMERGENCY MEDICINE

## 2025-04-29 PROCEDURE — 84484 ASSAY OF TROPONIN QUANT: CPT

## 2025-04-29 PROCEDURE — 85025 COMPLETE CBC W/AUTO DIFF WBC: CPT

## 2025-04-29 PROCEDURE — 71046 X-RAY EXAM CHEST 2 VIEWS: CPT

## 2025-04-29 PROCEDURE — 85379 FIBRIN DEGRADATION QUANT: CPT

## 2025-04-29 PROCEDURE — 84443 ASSAY THYROID STIM HORMONE: CPT

## 2025-04-29 RX ORDER — HYDROXYZINE HYDROCHLORIDE 25 MG/1
50 TABLET, FILM COATED ORAL ONCE
Status: COMPLETED | OUTPATIENT
Start: 2025-04-29 | End: 2025-04-29

## 2025-04-29 RX ORDER — 0.9 % SODIUM CHLORIDE 0.9 %
1000 INTRAVENOUS SOLUTION INTRAVENOUS ONCE
Status: COMPLETED | OUTPATIENT
Start: 2025-04-29 | End: 2025-04-29

## 2025-04-29 RX ORDER — HYDROXYZINE PAMOATE 25 MG/1
50 CAPSULE ORAL 4 TIMES DAILY PRN
Qty: 15 CAPSULE | Refills: 0 | Status: SHIPPED | OUTPATIENT
Start: 2025-04-29 | End: 2025-05-13

## 2025-04-29 RX ADMIN — HYDROXYZINE HYDROCHLORIDE 50 MG: 25 TABLET ORAL at 14:39

## 2025-04-29 RX ADMIN — SODIUM CHLORIDE 1000 ML: 0.9 INJECTION, SOLUTION INTRAVENOUS at 14:02

## 2025-04-29 ASSESSMENT — PAIN SCALES - GENERAL: PAINLEVEL_OUTOF10: 7

## 2025-04-29 ASSESSMENT — PAIN DESCRIPTION - PAIN TYPE: TYPE: ACUTE PAIN

## 2025-04-29 ASSESSMENT — PAIN DESCRIPTION - LOCATION: LOCATION: CHEST

## 2025-04-29 ASSESSMENT — LIFESTYLE VARIABLES
HOW OFTEN DO YOU HAVE A DRINK CONTAINING ALCOHOL: MONTHLY OR LESS
HOW MANY STANDARD DRINKS CONTAINING ALCOHOL DO YOU HAVE ON A TYPICAL DAY: 1 OR 2

## 2025-04-29 ASSESSMENT — PAIN - FUNCTIONAL ASSESSMENT
PAIN_FUNCTIONAL_ASSESSMENT: PREVENTS OR INTERFERES SOME ACTIVE ACTIVITIES AND ADLS
PAIN_FUNCTIONAL_ASSESSMENT: 0-10

## 2025-04-29 ASSESSMENT — PAIN DESCRIPTION - ORIENTATION: ORIENTATION: RIGHT

## 2025-04-29 ASSESSMENT — PAIN DESCRIPTION - DESCRIPTORS: DESCRIPTORS: TIGHTNESS

## 2025-04-29 NOTE — ED PROVIDER NOTES
returned within acceptable limits.  TSH, troponin, D-dimer returned within acceptable limits  Chest x-ray did not show any acute findings.    These fines were discussed with the patient at bedside.  At this time patient is stable for discharge home.  Patient will be given a dose of hydroxyzine here.  Patient will be discharged home on Vistaril.  Patient symptoms likely due to anxiety being off of her Wellbutrin as well since this coincides with timeframe of when her symptoms began.            It is my clinical impression today patient presents for: Anxiety state, chest pain, shortness of breath.        I've discussed my findings, impression in detail with the patient at the bedside.  I have provided the opportunity to ask questions, and have addressed all questions at the bedside.    Expectations, return precautions verbalized at bedside.            At this time I do feel patient is stable for discharge. I feel no further laboratory evaluation/imaging is indicated. At this time patient will be discharged in stable and non toxic condition. Patient has been advised to return for new, worsening, concerning symptoms.  Patient had all their questions answered and were agreeable to this plan            * Document created with voice recognition software which can lead to typographical errors.    Amount and/or Complexity of Data Reviewed  Labs: ordered. Decision-making details documented in ED Course.  Radiology: ordered.  ECG/medicine tests: ordered.    Risk  Prescription drug management.          Procedures       DISPOSITION: Discharge - Pending Orders Complete 04/29/2025 02:32:46 PM    CLINICAL IMPRESSION:   1. Shortness of breath    2. Chest pain, unspecified type    3. Anxiety state         Further personalized recommendations for outpatient care as below.    Key discharge instructions and summary of care provided in AVS:     Prescriptions provided to the patient:     New Prescriptions    HYDROXYZINE PAMOATE (VISTARIL)

## 2025-04-29 NOTE — DISCHARGE INSTRUCTIONS
Routine appointments for health maintenance with a primary care provider are very important and emergency department visits are no substitute.  You should review all findings and test results from your visit today with your primary care physician.        We recommended that you take medications as prescribed.     Please do not drive, operate heavy machinery, swim, bathe or perform any other activities as you may risk injury to yourself or others.    Return to the emergency department for any new or concerning signs/symptoms or failure to improve.

## 2025-04-29 NOTE — ED TRIAGE NOTES
Pt ambulatory to triage for SOB and chest pain intermittently since Saturday. Pt reports hx of anxiety and experiencing similar symptoms in the past. Pt reports she ran out of her Wellbutrin prior to symptoms starting.

## 2025-05-01 LAB
EKG ATRIAL RATE: 78 BPM
EKG DIAGNOSIS: NORMAL
EKG P AXIS: 30 DEGREES
EKG P-R INTERVAL: 142 MS
EKG Q-T INTERVAL: 358 MS
EKG QRS DURATION: 84 MS
EKG QTC CALCULATION (BAZETT): 408 MS
EKG R AXIS: 44 DEGREES
EKG T AXIS: 26 DEGREES
EKG VENTRICULAR RATE: 78 BPM

## 2025-05-01 PROCEDURE — 93010 ELECTROCARDIOGRAM REPORT: CPT | Performed by: SPECIALIST
